# Patient Record
Sex: FEMALE | Race: WHITE | NOT HISPANIC OR LATINO | Employment: FULL TIME | URBAN - METROPOLITAN AREA
[De-identification: names, ages, dates, MRNs, and addresses within clinical notes are randomized per-mention and may not be internally consistent; named-entity substitution may affect disease eponyms.]

---

## 2017-12-18 ENCOUNTER — TRANSCRIBE ORDERS (OUTPATIENT)
Dept: ADMINISTRATIVE | Facility: HOSPITAL | Age: 67
End: 2017-12-18

## 2017-12-18 DIAGNOSIS — R53.1 WEAKNESS OF RIGHT SIDE OF BODY: ICD-10-CM

## 2017-12-18 DIAGNOSIS — I16.0 HYPERTENSIVE URGENCY: Primary | ICD-10-CM

## 2017-12-22 ENCOUNTER — HOSPITAL ENCOUNTER (OUTPATIENT)
Dept: CT IMAGING | Facility: HOSPITAL | Age: 67
Discharge: HOME | End: 2017-12-22
Admitting: NURSE PRACTITIONER

## 2017-12-22 DIAGNOSIS — R53.1 WEAKNESS OF RIGHT SIDE OF BODY: ICD-10-CM

## 2017-12-22 DIAGNOSIS — I16.0 HYPERTENSIVE URGENCY: ICD-10-CM

## 2017-12-22 PROCEDURE — 70450 CT HEAD/BRAIN W/O DYE: CPT

## 2018-01-03 ENCOUNTER — APPOINTMENT (OUTPATIENT)
Dept: CT IMAGING | Facility: HOSPITAL | Age: 68
End: 2018-01-03

## 2018-01-03 ENCOUNTER — APPOINTMENT (OUTPATIENT)
Dept: GENERAL RADIOLOGY | Facility: HOSPITAL | Age: 68
End: 2018-01-03

## 2018-01-03 ENCOUNTER — HOSPITAL ENCOUNTER (INPATIENT)
Facility: HOSPITAL | Age: 68
LOS: 5 days | Discharge: HOME | End: 2018-01-08
Attending: EMERGENCY MEDICINE | Admitting: INTERNAL MEDICINE

## 2018-01-03 DIAGNOSIS — I63.9 ACUTE CVA (CEREBROVASCULAR ACCIDENT) (HCC): Primary | ICD-10-CM

## 2018-01-03 DIAGNOSIS — I10 ESSENTIAL HYPERTENSION: ICD-10-CM

## 2018-01-03 LAB
ABO GROUP BLD: NORMAL
ALBUMIN SERPL-MCNC: 4.3 G/DL (ref 3.5–5.2)
ALBUMIN/GLOB SERPL: 1.2 G/DL
ALP SERPL-CCNC: 79 U/L (ref 39–117)
ALT SERPL W P-5'-P-CCNC: 27 U/L (ref 1–33)
ANION GAP SERPL CALCULATED.3IONS-SCNC: 12.4 MMOL/L
AST SERPL-CCNC: 14 U/L (ref 1–32)
BASOPHILS # BLD AUTO: 0.03 10*3/MM3 (ref 0–0.2)
BASOPHILS NFR BLD AUTO: 0.3 % (ref 0–1.5)
BILIRUB SERPL-MCNC: 0.6 MG/DL (ref 0.1–1.2)
BLD GP AB SCN SERPL QL: NEGATIVE
BUN BLD-MCNC: 8 MG/DL (ref 8–23)
BUN/CREAT SERPL: 11 (ref 7–25)
CALCIUM SPEC-SCNC: 9.4 MG/DL (ref 8.6–10.5)
CHLORIDE SERPL-SCNC: 103 MMOL/L (ref 98–107)
CO2 SERPL-SCNC: 28.6 MMOL/L (ref 22–29)
CREAT BLD-MCNC: 0.73 MG/DL (ref 0.57–1)
DEPRECATED RDW RBC AUTO: 44.1 FL (ref 37–54)
EOSINOPHIL # BLD AUTO: 0.34 10*3/MM3 (ref 0–0.7)
EOSINOPHIL NFR BLD AUTO: 2.9 % (ref 0.3–6.2)
ERYTHROCYTE [DISTWIDTH] IN BLOOD BY AUTOMATED COUNT: 13.4 % (ref 11.7–13)
GFR SERPL CREATININE-BSD FRML MDRD: 80 ML/MIN/1.73
GLOBULIN UR ELPH-MCNC: 3.6 GM/DL
GLUCOSE BLD-MCNC: 110 MG/DL (ref 65–99)
HCT VFR BLD AUTO: 44.6 % (ref 35.6–45.5)
HGB BLD-MCNC: 15.2 G/DL (ref 11.9–15.5)
HOLD SPECIMEN: NORMAL
HOLD SPECIMEN: NORMAL
IMM GRANULOCYTES # BLD: 0.04 10*3/MM3 (ref 0–0.03)
IMM GRANULOCYTES NFR BLD: 0.3 % (ref 0–0.5)
INR PPP: 0.95 (ref 0.9–1.1)
LYMPHOCYTES # BLD AUTO: 2.46 10*3/MM3 (ref 0.9–4.8)
LYMPHOCYTES NFR BLD AUTO: 20.9 % (ref 19.6–45.3)
MCH RBC QN AUTO: 31.3 PG (ref 26.9–32)
MCHC RBC AUTO-ENTMCNC: 34.1 G/DL (ref 32.4–36.3)
MCV RBC AUTO: 91.8 FL (ref 80.5–98.2)
MONOCYTES # BLD AUTO: 1.05 10*3/MM3 (ref 0.2–1.2)
MONOCYTES NFR BLD AUTO: 8.9 % (ref 5–12)
NEUTROPHILS # BLD AUTO: 7.84 10*3/MM3 (ref 1.9–8.1)
NEUTROPHILS NFR BLD AUTO: 66.7 % (ref 42.7–76)
PLATELET # BLD AUTO: 298 10*3/MM3 (ref 140–500)
PMV BLD AUTO: 10.1 FL (ref 6–12)
POTASSIUM BLD-SCNC: 3.1 MMOL/L (ref 3.5–5.2)
PROT SERPL-MCNC: 7.9 G/DL (ref 6–8.5)
PROTHROMBIN TIME: 12.3 SECONDS (ref 11.7–14.2)
RBC # BLD AUTO: 4.86 10*6/MM3 (ref 3.9–5.2)
RH BLD: POSITIVE
SODIUM BLD-SCNC: 144 MMOL/L (ref 136–145)
WBC NRBC COR # BLD: 11.76 10*3/MM3 (ref 4.5–10.7)
WHOLE BLOOD HOLD SPECIMEN: NORMAL
WHOLE BLOOD HOLD SPECIMEN: NORMAL

## 2018-01-03 PROCEDURE — 80053 COMPREHEN METABOLIC PANEL: CPT | Performed by: EMERGENCY MEDICINE

## 2018-01-03 PROCEDURE — 85610 PROTHROMBIN TIME: CPT | Performed by: EMERGENCY MEDICINE

## 2018-01-03 PROCEDURE — 85025 COMPLETE CBC W/AUTO DIFF WBC: CPT | Performed by: EMERGENCY MEDICINE

## 2018-01-03 PROCEDURE — 86901 BLOOD TYPING SEROLOGIC RH(D): CPT | Performed by: EMERGENCY MEDICINE

## 2018-01-03 PROCEDURE — 71045 X-RAY EXAM CHEST 1 VIEW: CPT

## 2018-01-03 PROCEDURE — 70450 CT HEAD/BRAIN W/O DYE: CPT

## 2018-01-03 PROCEDURE — 99285 EMERGENCY DEPT VISIT HI MDM: CPT

## 2018-01-03 PROCEDURE — 93005 ELECTROCARDIOGRAM TRACING: CPT | Performed by: EMERGENCY MEDICINE

## 2018-01-03 PROCEDURE — 86900 BLOOD TYPING SEROLOGIC ABO: CPT | Performed by: EMERGENCY MEDICINE

## 2018-01-03 PROCEDURE — 86850 RBC ANTIBODY SCREEN: CPT | Performed by: EMERGENCY MEDICINE

## 2018-01-03 PROCEDURE — 93010 ELECTROCARDIOGRAM REPORT: CPT | Performed by: INTERNAL MEDICINE

## 2018-01-03 RX ORDER — LABETALOL HYDROCHLORIDE 5 MG/ML
10 INJECTION, SOLUTION INTRAVENOUS ONCE
Status: COMPLETED | OUTPATIENT
Start: 2018-01-03 | End: 2018-01-03

## 2018-01-03 RX ORDER — LOSARTAN POTASSIUM AND HYDROCHLOROTHIAZIDE 25; 100 MG/1; MG/1
1 TABLET ORAL DAILY
COMMUNITY
End: 2018-08-23 | Stop reason: ALTCHOICE

## 2018-01-03 RX ORDER — DILTIAZEM HYDROCHLORIDE 240 MG/1
240 CAPSULE, COATED, EXTENDED RELEASE ORAL DAILY
COMMUNITY
End: 2018-08-23 | Stop reason: ALTCHOICE

## 2018-01-03 RX ORDER — ASPIRIN 325 MG
325 TABLET ORAL ONCE
Status: COMPLETED | OUTPATIENT
Start: 2018-01-03 | End: 2018-01-03

## 2018-01-03 RX ORDER — CLONIDINE HYDROCHLORIDE 0.1 MG/1
0.1 TABLET ORAL DAILY
COMMUNITY

## 2018-01-03 RX ORDER — SODIUM CHLORIDE 0.9 % (FLUSH) 0.9 %
10 SYRINGE (ML) INJECTION AS NEEDED
Status: DISCONTINUED | OUTPATIENT
Start: 2018-01-03 | End: 2018-01-08 | Stop reason: HOSPADM

## 2018-01-03 RX ADMIN — LABETALOL HYDROCHLORIDE 10 MG: 5 INJECTION, SOLUTION INTRAVENOUS at 19:24

## 2018-01-03 RX ADMIN — ASPIRIN 325 MG: 325 TABLET ORAL at 19:23

## 2018-01-03 NOTE — ED PROVIDER NOTES
EMERGENCY DEPARTMENT ENCOUNTER    CHIEF COMPLAINT  Chief Complaint: Confusion and right sided weakness  History given by: Patient  History limited by: none  Room Number: 04/04  PMD: JENNIFER Cintron      HPI:  Pt is a 67 y.o. female who presents complaining of confusion and constant right sided weakness that began suddenly at 1300 upon waking up. Pt states she had no symptoms when getting off work at 0100 this morning. Pt confirms difficulty speaking, but denies numbness and headache. Pt states she had a similar episode 12/18 and saw PCP where she was put on medication for HTN. Pt states weakness never fully went away after episode 3 weeks ago. Pt states she has no kx of TIA or CVA.     Duration:  5 hours  Onset: sudden  Timing: constant  Location: right side  Radiation: none  Quality: confusion and weakness  Intensity/Severity: moderate  Progression: unchanged  Associated Symptoms: difficulty speaking  Aggravating Factors: none  Alleviating Factors: none  Previous Episodes: Pt had a similar episode on 12/18 and was seen by PCP.  Treatment before arrival: none    PAST MEDICAL HISTORY  Active Ambulatory Problems     Diagnosis Date Noted   • No Active Ambulatory Problems     Resolved Ambulatory Problems     Diagnosis Date Noted   • No Resolved Ambulatory Problems     Past Medical History:   Diagnosis Date   • Hypertension    • TIA (transient ischemic attack)        PAST SURGICAL HISTORY  Past Surgical History:   Procedure Laterality Date   • TONSILLECTOMY     • TUBAL ABDOMINAL LIGATION     • WISDOM TOOTH EXTRACTION         FAMILY HISTORY  History reviewed. No pertinent family history.    SOCIAL HISTORY  Social History     Social History   • Marital status: Single     Spouse name: N/A   • Number of children: N/A   • Years of education: N/A     Occupational History   • Not on file.     Social History Main Topics   • Smoking status: Never Smoker   • Smokeless tobacco: Not on file   • Alcohol use No   • Drug use: No   •  Sexual activity: Not on file     Other Topics Concern   • Not on file     Social History Narrative   • No narrative on file       ALLERGIES  Review of patient's allergies indicates no known allergies.    REVIEW OF SYSTEMS  Review of Systems   Constitutional: Negative for fever.   HENT: Negative for sore throat.    Eyes: Negative.    Respiratory: Negative for cough and shortness of breath.    Cardiovascular: Negative for chest pain.   Gastrointestinal: Negative for abdominal pain, diarrhea and vomiting.   Genitourinary: Negative for dysuria.   Musculoskeletal: Negative for neck pain.   Skin: Negative for rash.   Allergic/Immunologic: Negative.    Neurological: Positive for speech difficulty and weakness (right sided). Negative for numbness and headaches.   Hematological: Negative.    Psychiatric/Behavioral: Positive for confusion.   All other systems reviewed and are negative.      PHYSICAL EXAM  ED Triage Vitals   Temp Heart Rate Resp BP SpO2   01/03/18 1800 01/03/18 1800 01/03/18 1800 01/03/18 1802 01/03/18 1800   98.2 °F (36.8 °C) 90 18 205/103 98 %      Temp src Heart Rate Source Patient Position BP Location FiO2 (%)   01/03/18 1800 -- -- -- --   Tympanic           Physical Exam   Constitutional: She is oriented to person, place, and time and well-developed, well-nourished, and in no distress. No distress.   HENT:   Head: Normocephalic and atraumatic.   Eyes: EOM are normal. Pupils are equal, round, and reactive to light.   Neck: Normal range of motion. Neck supple.   Cardiovascular: Normal rate, regular rhythm and normal heart sounds.    Pulmonary/Chest: Effort normal and breath sounds normal. No respiratory distress.   Abdominal: Soft. There is no tenderness. There is no rebound and no guarding.   Musculoskeletal: Normal range of motion. She exhibits no edema.   Neurological: She is alert and oriented to person, place, and time. She has normal sensation, normal strength and intact cranial nerves. She has a  normal Finger-Nose-Finger Test and a normal Heel to Shin Test.   Stroke Scale: 1 for right leg drift.    Skin: Skin is warm and dry. No rash noted.   Psychiatric: Mood and affect normal.   Nursing note and vitals reviewed.      LAB RESULTS  Lab Results (last 24 hours)     Procedure Component Value Units Date/Time    CBC & Differential [[id number]] Collected:  01/03/18 1845    Specimen:  Blood Updated:  01/03/18 1951    Narrative:       The following orders were created for panel order CBC & Differential.  Procedure                               Abnormality         Status                     ---------                               -----------         ------                     CBC Auto Differential[[id number]]        Abnormal            Final result                 Please view results for these tests on the individual orders.    Comprehensive Metabolic Panel [[id number]] Collected:  01/03/18 1845    Specimen:  Blood Updated:  01/03/18 1946    Protime-INR [[id number]]  (Normal) Collected:  01/03/18 1845    Specimen:  Blood Updated:  01/03/18 2000     Protime 12.3 Seconds      INR 0.95    CBC Auto Differential [[id number]]  (Abnormal) Collected:  01/03/18 1845    Specimen:  Blood Updated:  01/03/18 1951     WBC 11.76 (H) 10*3/mm3      RBC 4.86 10*6/mm3      Hemoglobin 15.2 g/dL      Hematocrit 44.6 %      MCV 91.8 fL      MCH 31.3 pg      MCHC 34.1 g/dL      RDW 13.4 (H) %      RDW-SD 44.1 fl      MPV 10.1 fL      Platelets 298 10*3/mm3      Neutrophil % 66.7 %      Lymphocyte % 20.9 %      Monocyte % 8.9 %      Eosinophil % 2.9 %      Basophil % 0.3 %      Immature Grans % 0.3 %      Neutrophils, Absolute 7.84 10*3/mm3      Lymphocytes, Absolute 2.46 10*3/mm3      Monocytes, Absolute 1.05 10*3/mm3      Eosinophils, Absolute 0.34 10*3/mm3      Basophils, Absolute 0.03 10*3/mm3      Immature Grans, Absolute 0.04 (H) 10*3/mm3           I ordered the above labs and reviewed the results    RADIOLOGY  XR Chest 1 View   Final  Result   No focal pulmonary consolidation. Enlarged cardiac   silhouette. Tortuous aorta. Follow-up/further evaluation can be obtained   as indicated.       This report was finalized on 1/3/2018 6:47 PM by Dr. Raciel Devries MD.          CT Head Without Contrast Stroke Protocol    (Results Pending)  Several old left infarcts, but one subacute infarct in left frontal lobe.         I ordered the above noted radiological studies. Interpreted by radiologist. Discussed with radiologist (Greyson). Reviewed by me in PACS.       PROCEDURES  Procedures  EKG           EKG time: 1901  Rhythm/Rate: SR 63  P waves and [place]: nml  QRS, axis: left axis deviation, LVH   ST and T waves: nonspecific T wave changes laterally     Interpreted Contemporaneously by me, independently viewed  No prior for comparison.     PROGRESS AND CONSULTS  ED Course     1807  Labs, CXR, and CT Head ordered for further evaluation.     1903   mg and Normodyne ordered for symptom management.     2023  Call placed to [facility].     2044  Discussed pt's case with Dr. Graham ([facility]) who agreed to admit pt to telemetry due to CVA and symptoms.       MEDICAL DECISION MAKING  Results were reviewed/discussed with the patient and they were also made aware of online access. Pt also made aware that some labs, such as cultures, will not be resulted during ER visit and follow up with PMD is necessary.     MDM  Number of Diagnoses or Management Options  Acute CVA (cerebrovascular accident):   Essential hypertension:   Diagnosis management comments: Patient states she's had right-sided weakness for about 2 weeks.  She had a head CT done at that time which showed multiple small infarcts.  She woke up today around 1 PM and noticed increased weakness in her right arm and leg.  She was not a TPA candidate because her symptoms have been present for over 12 hours.  Her initial NIH score was 1.  Head CT showed a new left sided infarct.  Patient was given an aspirin.  She was  hypertensive and was given a dose of IV labetalol.  She states her blood pressure has been elevated recently.  She was recently started on several new blood pressure meds.  Case was discussed with Dr. Graham and he agreed to admit the patient.       Amount and/or Complexity of Data Reviewed  Clinical lab tests: ordered and reviewed (WBC - 11.76)  Tests in the radiology section of CPT®: ordered and reviewed (CXR- no focal pulmonary consolidation, enlarged cardiac silhouette, tortuous aorta.     CT Head - several old left infarcts, subacute infarct in left frontal lobe. )  Tests in the medicine section of CPT®: ordered and reviewed (See note. )  Review and summarize past medical records: yes (12/22/17 - CT Head - showed atrophy, multiple small infarct, no acute infarct.     Pt Is not a TPA candidate. Symptoms have been present for greater than 12 hours. )  Discuss the patient with other providers: yes (Dr. Graham ([facility]))          NIHSS (NIH Stroke Scale/Score) reviewed and/or performed as part of the patient evaluation and treatment planning process.  The result associated with this review/performance is: 1      DIAGNOSIS  Final diagnoses:   None       DISPOSITION  ADMISSION    Discussed treatment plan and reason for admission with pt/family and admitting physician.  Pt/family voiced understanding of the plan for admission for further testing/treatment as needed.       Latest Documented Vital Signs:  As of 8:03 PM  BP- 169/86 HR- 55 Temp- 98.2 °F (36.8 °C) (Tympanic) O2 sat- 97%    --  Documentation assistance provided by alie Pacheco for Dr. Armas.  Information recorded by the alie was done at my direction and has been verified and validated by me.                 Jenifer Pacheco  01/03/18 204       Marco Antonio Armas MD  01/03/18 2536

## 2018-01-04 ENCOUNTER — APPOINTMENT (OUTPATIENT)
Dept: MRI IMAGING | Facility: HOSPITAL | Age: 68
End: 2018-01-04
Attending: INTERNAL MEDICINE

## 2018-01-04 ENCOUNTER — APPOINTMENT (OUTPATIENT)
Dept: CARDIOLOGY | Facility: HOSPITAL | Age: 68
End: 2018-01-04
Attending: INTERNAL MEDICINE

## 2018-01-04 PROBLEM — E78.5 DYSLIPIDEMIA: Status: ACTIVE | Noted: 2018-01-04

## 2018-01-04 PROBLEM — I67.2 INTRACRANIAL ATHEROSCLEROSIS: Chronic | Status: ACTIVE | Noted: 2018-01-04

## 2018-01-04 PROBLEM — I10 HTN (HYPERTENSION): Status: ACTIVE | Noted: 2018-01-04

## 2018-01-04 LAB
ALBUMIN SERPL-MCNC: 4 G/DL (ref 3.5–5.2)
ALBUMIN/GLOB SERPL: 1.3 G/DL
ALP SERPL-CCNC: 74 U/L (ref 39–117)
ALT SERPL W P-5'-P-CCNC: 24 U/L (ref 1–33)
ANION GAP SERPL CALCULATED.3IONS-SCNC: 9.8 MMOL/L
ASCENDING AORTA: 3 CM
AST SERPL-CCNC: 10 U/L (ref 1–32)
BH CV ECHO MEAS - ACS: 1.4 CM
BH CV ECHO MEAS - AO MAX PG: 23 MMHG
BH CV ECHO MEAS - AO MEAN PG (FULL): 8 MMHG
BH CV ECHO MEAS - AO MEAN PG: 12 MMHG
BH CV ECHO MEAS - AO ROOT AREA (BSA CORRECTED): 1.5
BH CV ECHO MEAS - AO ROOT AREA: 5.7 CM^2
BH CV ECHO MEAS - AO ROOT DIAM: 2.7 CM
BH CV ECHO MEAS - AO V2 MAX: 240 CM/SEC
BH CV ECHO MEAS - AO V2 MEAN: 162 CM/SEC
BH CV ECHO MEAS - AO V2 VTI: 46 CM
BH CV ECHO MEAS - ASC AORTA: 3 CM
BH CV ECHO MEAS - AVA(I,A): 2.4 CM^2
BH CV ECHO MEAS - AVA(I,D): 2.4 CM^2
BH CV ECHO MEAS - BSA(HAYCOCK): 1.8 M^2
BH CV ECHO MEAS - BSA: 1.7 M^2
BH CV ECHO MEAS - BZI_BMI: 33.2 KILOGRAMS/M^2
BH CV ECHO MEAS - BZI_METRIC_HEIGHT: 152.4 CM
BH CV ECHO MEAS - BZI_METRIC_WEIGHT: 77.1 KG
BH CV ECHO MEAS - CONTRAST EF (2CH): 67.2 ML/M^2
BH CV ECHO MEAS - CONTRAST EF 4CH: 60.2 ML/M^2
BH CV ECHO MEAS - EDV(CUBED): 148.9 ML
BH CV ECHO MEAS - EDV(MOD-SP2): 67 ML
BH CV ECHO MEAS - EDV(MOD-SP4): 103 ML
BH CV ECHO MEAS - EDV(TEICH): 135.3 ML
BH CV ECHO MEAS - EF(CUBED): 71.2 %
BH CV ECHO MEAS - EF(MOD-SP2): 67.2 %
BH CV ECHO MEAS - EF(MOD-SP4): 60.2 %
BH CV ECHO MEAS - EF(TEICH): 62.4 %
BH CV ECHO MEAS - ESV(CUBED): 42.9 ML
BH CV ECHO MEAS - ESV(MOD-SP2): 22 ML
BH CV ECHO MEAS - ESV(MOD-SP4): 41 ML
BH CV ECHO MEAS - ESV(TEICH): 50.9 ML
BH CV ECHO MEAS - FS: 34 %
BH CV ECHO MEAS - IVS/LVPW: 1
BH CV ECHO MEAS - IVSD: 1.3 CM
BH CV ECHO MEAS - LAT PEAK E' VEL: 7 CM/SEC
BH CV ECHO MEAS - LV DIASTOLIC VOL/BSA (35-75): 59.1 ML/M^2
BH CV ECHO MEAS - LV MASS(C)D: 286.9 GRAMS
BH CV ECHO MEAS - LV MASS(C)DI: 164.7 GRAMS/M^2
BH CV ECHO MEAS - LV MEAN PG: 4 MMHG
BH CV ECHO MEAS - LV SYSTOLIC VOL/BSA (12-30): 23.5 ML/M^2
BH CV ECHO MEAS - LV V1 MEAN: 88.8 CM/SEC
BH CV ECHO MEAS - LV V1 VTI: 31.6 CM
BH CV ECHO MEAS - LVIDD: 5.3 CM
BH CV ECHO MEAS - LVIDS: 3.5 CM
BH CV ECHO MEAS - LVLD AP2: 8 CM
BH CV ECHO MEAS - LVLD AP4: 8.2 CM
BH CV ECHO MEAS - LVLS AP2: 6.5 CM
BH CV ECHO MEAS - LVLS AP4: 6.8 CM
BH CV ECHO MEAS - LVOT AREA (M): 3.5 CM^2
BH CV ECHO MEAS - LVOT AREA: 3.5 CM^2
BH CV ECHO MEAS - LVOT DIAM: 2.1 CM
BH CV ECHO MEAS - LVPWD: 1.3 CM
BH CV ECHO MEAS - MED PEAK E' VEL: 4 CM/SEC
BH CV ECHO MEAS - MR MAX PG: 53.6 MMHG
BH CV ECHO MEAS - MR MAX VEL: 366 CM/SEC
BH CV ECHO MEAS - MV A DUR: 127 SEC
BH CV ECHO MEAS - MV A MAX VEL: 96.9 CM/SEC
BH CV ECHO MEAS - MV DEC SLOPE: 308 CM/SEC^2
BH CV ECHO MEAS - MV DEC TIME: 288 SEC
BH CV ECHO MEAS - MV E MAX VEL: 95 CM/SEC
BH CV ECHO MEAS - MV E/A: 0.98
BH CV ECHO MEAS - MV MEAN PG: 2 MMHG
BH CV ECHO MEAS - MV P1/2T MAX VEL: 105 CM/SEC
BH CV ECHO MEAS - MV P1/2T: 99.9 MSEC
BH CV ECHO MEAS - MV V2 MEAN: 56.7 CM/SEC
BH CV ECHO MEAS - MV V2 VTI: 38.2 CM
BH CV ECHO MEAS - MVA P1/2T LCG: 2.1 CM^2
BH CV ECHO MEAS - MVA(P1/2T): 2.2 CM^2
BH CV ECHO MEAS - MVA(VTI): 2.9 CM^2
BH CV ECHO MEAS - PA ACC SLOPE: 930 CM/SEC^2
BH CV ECHO MEAS - PA ACC TIME: 0.12 SEC
BH CV ECHO MEAS - PA MAX PG (FULL): 0.86 MMHG
BH CV ECHO MEAS - PA MAX PG: 5.1 MMHG
BH CV ECHO MEAS - PA PR(ACCEL): 25 MMHG
BH CV ECHO MEAS - PA V2 MAX: 113 CM/SEC
BH CV ECHO MEAS - PULM A REVS DUR: 88 SEC
BH CV ECHO MEAS - PULM A REVS VEL: 28.1 CM/SEC
BH CV ECHO MEAS - PULM DIAS VEL: 30.3 CM/SEC
BH CV ECHO MEAS - PULM S/D: 1.2
BH CV ECHO MEAS - PULM SYS VEL: 37.2 CM/SEC
BH CV ECHO MEAS - PVA(V,A): 2.6 CM^2
BH CV ECHO MEAS - PVA(V,D): 2.6 CM^2
BH CV ECHO MEAS - QP/QS: 0.56
BH CV ECHO MEAS - RAP SYSTOLE: 3 MMHG
BH CV ECHO MEAS - RV MAX PG: 4.2 MMHG
BH CV ECHO MEAS - RV MEAN PG: 2 MMHG
BH CV ECHO MEAS - RV V1 MAX: 103 CM/SEC
BH CV ECHO MEAS - RV V1 MEAN: 64.8 CM/SEC
BH CV ECHO MEAS - RV V1 VTI: 21.6 CM
BH CV ECHO MEAS - RVOT AREA: 2.8 CM^2
BH CV ECHO MEAS - RVOT DIAM: 1.9 CM
BH CV ECHO MEAS - RVSP: 19 MMHG
BH CV ECHO MEAS - SI(AO): 151.2 ML/M^2
BH CV ECHO MEAS - SI(CUBED): 60.8 ML/M^2
BH CV ECHO MEAS - SI(LVOT): 62.8 ML/M^2
BH CV ECHO MEAS - SI(MOD-SP2): 25.8 ML/M^2
BH CV ECHO MEAS - SI(MOD-SP4): 35.6 ML/M^2
BH CV ECHO MEAS - SI(TEICH): 48.5 ML/M^2
BH CV ECHO MEAS - SV(AO): 263.4 ML
BH CV ECHO MEAS - SV(CUBED): 106 ML
BH CV ECHO MEAS - SV(LVOT): 109.5 ML
BH CV ECHO MEAS - SV(MOD-SP2): 45 ML
BH CV ECHO MEAS - SV(MOD-SP4): 62 ML
BH CV ECHO MEAS - SV(RVOT): 61.2 ML
BH CV ECHO MEAS - SV(TEICH): 84.5 ML
BH CV ECHO MEAS - TAPSE (>1.6): 1.7 CM2
BH CV ECHO MEAS - TR MAX VEL: 197 CM/SEC
BH CV VAS BP RIGHT ARM: NORMAL MMHG
BH CV XLRA - RV BASE: 4.1 CM
BH CV XLRA - RV LENGTH: 9.3 CM
BH CV XLRA - RV MID: 3.3 CM
BH CV XLRA - TDI S': 13 CM/SEC
BILIRUB SERPL-MCNC: 0.6 MG/DL (ref 0.1–1.2)
BUN BLD-MCNC: 8 MG/DL (ref 8–23)
BUN/CREAT SERPL: 12.3 (ref 7–25)
CALCIUM SPEC-SCNC: 9 MG/DL (ref 8.6–10.5)
CHLORIDE SERPL-SCNC: 101 MMOL/L (ref 98–107)
CHOLEST SERPL-MCNC: 213 MG/DL (ref 0–200)
CO2 SERPL-SCNC: 30.2 MMOL/L (ref 22–29)
CREAT BLD-MCNC: 0.65 MG/DL (ref 0.57–1)
CRP SERPL-MCNC: 0.73 MG/DL (ref 0–0.5)
DEPRECATED RDW RBC AUTO: 45.5 FL (ref 37–54)
E/E' RATIO: 18
ERYTHROCYTE [DISTWIDTH] IN BLOOD BY AUTOMATED COUNT: 13.3 % (ref 11.7–13)
ERYTHROCYTE [SEDIMENTATION RATE] IN BLOOD: 19 MM/HR (ref 0–30)
FIBRINOGEN PPP-MCNC: 390 MG/DL (ref 219–464)
GFR SERPL CREATININE-BSD FRML MDRD: 91 ML/MIN/1.73
GLOBULIN UR ELPH-MCNC: 3.1 GM/DL
GLUCOSE BLD-MCNC: 97 MG/DL (ref 65–99)
GLUCOSE BLDC GLUCOMTR-MCNC: 95 MG/DL (ref 70–130)
HBA1C MFR BLD: 5.6 % (ref 4.8–5.6)
HCT VFR BLD AUTO: 45.8 % (ref 35.6–45.5)
HDLC SERPL-MCNC: 34 MG/DL (ref 40–60)
HGB BLD-MCNC: 14.7 G/DL (ref 11.9–15.5)
INR PPP: 1.08 (ref 0.9–1.1)
LDLC SERPL CALC-MCNC: 139 MG/DL (ref 0–100)
LDLC/HDLC SERPL: 4.1 {RATIO}
LEFT ATRIUM VOLUME INDEX: 33 ML/M2
MAGNESIUM SERPL-MCNC: 2.5 MG/DL (ref 1.6–2.4)
MCH RBC QN AUTO: 30.1 PG (ref 26.9–32)
MCHC RBC AUTO-ENTMCNC: 32.1 G/DL (ref 32.4–36.3)
MCV RBC AUTO: 93.7 FL (ref 80.5–98.2)
PLATELET # BLD AUTO: 281 10*3/MM3 (ref 140–500)
PMV BLD AUTO: 9.9 FL (ref 6–12)
POTASSIUM BLD-SCNC: 3.5 MMOL/L (ref 3.5–5.2)
PROT SERPL-MCNC: 7.1 G/DL (ref 6–8.5)
PROTHROMBIN TIME: 13.6 SECONDS (ref 11.7–14.2)
RBC # BLD AUTO: 4.89 10*6/MM3 (ref 3.9–5.2)
SODIUM BLD-SCNC: 141 MMOL/L (ref 136–145)
T4 FREE SERPL-MCNC: 1.14 NG/DL (ref 0.93–1.7)
TRIGL SERPL-MCNC: 198 MG/DL (ref 0–150)
TSH SERPL DL<=0.05 MIU/L-ACNC: 2.27 MIU/ML (ref 0.27–4.2)
VLDLC SERPL-MCNC: 39.6 MG/DL (ref 5–40)
WBC NRBC COR # BLD: 9.78 10*3/MM3 (ref 4.5–10.7)

## 2018-01-04 PROCEDURE — 97161 PT EVAL LOW COMPLEX 20 MIN: CPT

## 2018-01-04 PROCEDURE — 82962 GLUCOSE BLOOD TEST: CPT

## 2018-01-04 PROCEDURE — 85384 FIBRINOGEN ACTIVITY: CPT | Performed by: RADIOLOGY

## 2018-01-04 PROCEDURE — 85652 RBC SED RATE AUTOMATED: CPT | Performed by: RADIOLOGY

## 2018-01-04 PROCEDURE — 83090 ASSAY OF HOMOCYSTEINE: CPT | Performed by: RADIOLOGY

## 2018-01-04 PROCEDURE — 86140 C-REACTIVE PROTEIN: CPT | Performed by: RADIOLOGY

## 2018-01-04 PROCEDURE — 93306 TTE W/DOPPLER COMPLETE: CPT

## 2018-01-04 PROCEDURE — 85027 COMPLETE CBC AUTOMATED: CPT | Performed by: INTERNAL MEDICINE

## 2018-01-04 PROCEDURE — 70553 MRI BRAIN STEM W/O & W/DYE: CPT

## 2018-01-04 PROCEDURE — 0 GADOBENATE DIMEGLUMINE 529 MG/ML SOLUTION: Performed by: HOSPITALIST

## 2018-01-04 PROCEDURE — 70544 MR ANGIOGRAPHY HEAD W/O DYE: CPT

## 2018-01-04 PROCEDURE — 80053 COMPREHEN METABOLIC PANEL: CPT | Performed by: INTERNAL MEDICINE

## 2018-01-04 PROCEDURE — A9577 INJ MULTIHANCE: HCPCS | Performed by: HOSPITALIST

## 2018-01-04 PROCEDURE — 25010000002 PERFLUTREN (DEFINITY) 8.476 MG IN SODIUM CHLORIDE 0.9 % 10 ML INJECTION: Performed by: HOSPITALIST

## 2018-01-04 PROCEDURE — 84439 ASSAY OF FREE THYROXINE: CPT | Performed by: INTERNAL MEDICINE

## 2018-01-04 PROCEDURE — 70549 MR ANGIOGRAPH NECK W/O&W/DYE: CPT

## 2018-01-04 PROCEDURE — 92610 EVALUATE SWALLOWING FUNCTION: CPT

## 2018-01-04 PROCEDURE — 84443 ASSAY THYROID STIM HORMONE: CPT | Performed by: INTERNAL MEDICINE

## 2018-01-04 PROCEDURE — 93306 TTE W/DOPPLER COMPLETE: CPT | Performed by: INTERNAL MEDICINE

## 2018-01-04 PROCEDURE — 99255 IP/OBS CONSLTJ NEW/EST HI 80: CPT | Performed by: RADIOLOGY

## 2018-01-04 PROCEDURE — 80061 LIPID PANEL: CPT | Performed by: INTERNAL MEDICINE

## 2018-01-04 PROCEDURE — 85610 PROTHROMBIN TIME: CPT | Performed by: INTERNAL MEDICINE

## 2018-01-04 PROCEDURE — 99253 IP/OBS CNSLTJ NEW/EST LOW 45: CPT | Performed by: INTERNAL MEDICINE

## 2018-01-04 PROCEDURE — 97110 THERAPEUTIC EXERCISES: CPT

## 2018-01-04 PROCEDURE — 83735 ASSAY OF MAGNESIUM: CPT | Performed by: INTERNAL MEDICINE

## 2018-01-04 PROCEDURE — 83036 HEMOGLOBIN GLYCOSYLATED A1C: CPT | Performed by: INTERNAL MEDICINE

## 2018-01-04 RX ORDER — POTASSIUM CHLORIDE 7.45 MG/ML
10 INJECTION INTRAVENOUS
Status: DISCONTINUED | OUTPATIENT
Start: 2018-01-04 | End: 2018-01-08 | Stop reason: HOSPADM

## 2018-01-04 RX ORDER — HYDROCHLOROTHIAZIDE 25 MG/1
25 TABLET ORAL DAILY
Status: DISCONTINUED | OUTPATIENT
Start: 2018-01-04 | End: 2018-01-08 | Stop reason: HOSPADM

## 2018-01-04 RX ORDER — LORAZEPAM 1 MG/1
1 TABLET ORAL ONCE AS NEEDED
Status: COMPLETED | OUTPATIENT
Start: 2018-01-04 | End: 2018-01-04

## 2018-01-04 RX ORDER — POTASSIUM CHLORIDE 750 MG/1
40 CAPSULE, EXTENDED RELEASE ORAL AS NEEDED
Status: DISCONTINUED | OUTPATIENT
Start: 2018-01-04 | End: 2018-01-08 | Stop reason: HOSPADM

## 2018-01-04 RX ORDER — POTASSIUM CHLORIDE 1.5 G/1.77G
40 POWDER, FOR SOLUTION ORAL AS NEEDED
Status: DISCONTINUED | OUTPATIENT
Start: 2018-01-04 | End: 2018-01-08 | Stop reason: HOSPADM

## 2018-01-04 RX ORDER — ASPIRIN 300 MG/1
300 SUPPOSITORY RECTAL DAILY
Status: DISCONTINUED | OUTPATIENT
Start: 2018-01-04 | End: 2018-01-08 | Stop reason: HOSPADM

## 2018-01-04 RX ORDER — SODIUM CHLORIDE 9 MG/ML
75 INJECTION, SOLUTION INTRAVENOUS CONTINUOUS
Status: DISCONTINUED | OUTPATIENT
Start: 2018-01-04 | End: 2018-01-05

## 2018-01-04 RX ORDER — ALUMINA, MAGNESIA, AND SIMETHICONE 2400; 2400; 240 MG/30ML; MG/30ML; MG/30ML
7.5 SUSPENSION ORAL EVERY 4 HOURS PRN
Status: DISCONTINUED | OUTPATIENT
Start: 2018-01-04 | End: 2018-01-08 | Stop reason: HOSPADM

## 2018-01-04 RX ORDER — ONDANSETRON 2 MG/ML
4 INJECTION INTRAMUSCULAR; INTRAVENOUS EVERY 6 HOURS PRN
Status: DISCONTINUED | OUTPATIENT
Start: 2018-01-04 | End: 2018-01-08 | Stop reason: HOSPADM

## 2018-01-04 RX ORDER — CLOPIDOGREL BISULFATE 75 MG/1
75 TABLET ORAL DAILY
Status: DISCONTINUED | OUTPATIENT
Start: 2018-01-05 | End: 2018-01-08 | Stop reason: HOSPADM

## 2018-01-04 RX ORDER — LOSARTAN POTASSIUM 100 MG/1
100 TABLET ORAL
Status: DISCONTINUED | OUTPATIENT
Start: 2018-01-04 | End: 2018-01-08 | Stop reason: HOSPADM

## 2018-01-04 RX ORDER — ASPIRIN 325 MG
325 TABLET ORAL DAILY
Status: DISCONTINUED | OUTPATIENT
Start: 2018-01-04 | End: 2018-01-08 | Stop reason: HOSPADM

## 2018-01-04 RX ORDER — PRAVASTATIN SODIUM 20 MG
20 TABLET ORAL EVERY EVENING
Status: DISCONTINUED | OUTPATIENT
Start: 2018-01-04 | End: 2018-01-05

## 2018-01-04 RX ORDER — LABETALOL HYDROCHLORIDE 5 MG/ML
20 INJECTION, SOLUTION INTRAVENOUS EVERY 6 HOURS PRN
Status: DISCONTINUED | OUTPATIENT
Start: 2018-01-04 | End: 2018-01-08 | Stop reason: HOSPADM

## 2018-01-04 RX ORDER — CLONIDINE HYDROCHLORIDE 0.1 MG/1
0.2 TABLET ORAL EVERY 12 HOURS SCHEDULED
Status: DISCONTINUED | OUTPATIENT
Start: 2018-01-04 | End: 2018-01-05

## 2018-01-04 RX ORDER — ATORVASTATIN CALCIUM 80 MG/1
80 TABLET, FILM COATED ORAL NIGHTLY
Status: DISCONTINUED | OUTPATIENT
Start: 2018-01-04 | End: 2018-01-04

## 2018-01-04 RX ORDER — LOSARTAN POTASSIUM AND HYDROCHLOROTHIAZIDE 25; 100 MG/1; MG/1
1 TABLET ORAL DAILY
Status: DISCONTINUED | OUTPATIENT
Start: 2018-01-04 | End: 2018-01-04 | Stop reason: CLARIF

## 2018-01-04 RX ORDER — BISACODYL 10 MG
10 SUPPOSITORY, RECTAL RECTAL DAILY PRN
Status: DISCONTINUED | OUTPATIENT
Start: 2018-01-04 | End: 2018-01-08 | Stop reason: HOSPADM

## 2018-01-04 RX ORDER — SODIUM CHLORIDE 0.9 % (FLUSH) 0.9 %
1-10 SYRINGE (ML) INJECTION AS NEEDED
Status: DISCONTINUED | OUTPATIENT
Start: 2018-01-04 | End: 2018-01-08 | Stop reason: HOSPADM

## 2018-01-04 RX ORDER — DOCUSATE SODIUM 100 MG/1
100 CAPSULE, LIQUID FILLED ORAL 2 TIMES DAILY PRN
Status: DISCONTINUED | OUTPATIENT
Start: 2018-01-04 | End: 2018-01-08 | Stop reason: HOSPADM

## 2018-01-04 RX ORDER — CLOPIDOGREL BISULFATE 75 MG/1
300 TABLET ORAL ONCE
Status: COMPLETED | OUTPATIENT
Start: 2018-01-04 | End: 2018-01-04

## 2018-01-04 RX ORDER — ROSUVASTATIN CALCIUM 10 MG/1
10 TABLET, COATED ORAL NIGHTLY
Status: DISCONTINUED | OUTPATIENT
Start: 2018-01-04 | End: 2018-01-04

## 2018-01-04 RX ADMIN — ATORVASTATIN CALCIUM 80 MG: 80 TABLET, FILM COATED ORAL at 02:14

## 2018-01-04 RX ADMIN — SODIUM CHLORIDE 125 ML/HR: 9 INJECTION, SOLUTION INTRAVENOUS at 22:31

## 2018-01-04 RX ADMIN — HYDROCHLOROTHIAZIDE 25 MG: 25 TABLET ORAL at 16:45

## 2018-01-04 RX ADMIN — LORAZEPAM 1 MG: 1 TABLET ORAL at 08:18

## 2018-01-04 RX ADMIN — PERFLUTREN 5 ML: 6.52 INJECTION, SUSPENSION INTRAVENOUS at 13:29

## 2018-01-04 RX ADMIN — CLONIDINE HYDROCHLORIDE 0.2 MG: 0.1 TABLET ORAL at 20:17

## 2018-01-04 RX ADMIN — ASPIRIN 325 MG: 325 TABLET ORAL at 08:18

## 2018-01-04 RX ADMIN — GADOBENATE DIMEGLUMINE 16 ML: 529 INJECTION, SOLUTION INTRAVENOUS at 10:00

## 2018-01-04 RX ADMIN — CLONIDINE HYDROCHLORIDE 0.2 MG: 0.1 TABLET ORAL at 08:18

## 2018-01-04 RX ADMIN — SODIUM CHLORIDE 125 ML/HR: 9 INJECTION, SOLUTION INTRAVENOUS at 11:50

## 2018-01-04 RX ADMIN — ROSUVASTATIN CALCIUM 10 MG: 10 TABLET, FILM COATED ORAL at 20:18

## 2018-01-04 RX ADMIN — LOSARTAN POTASSIUM 100 MG: 100 TABLET ORAL at 16:46

## 2018-01-04 RX ADMIN — CLONIDINE HYDROCHLORIDE 0.2 MG: 0.1 TABLET ORAL at 02:14

## 2018-01-04 RX ADMIN — CLOPIDOGREL 300 MG: 75 TABLET, FILM COATED ORAL at 16:46

## 2018-01-04 NOTE — CONSULTS
"DOS: 2018  NAME: Roberta Houston   : 1950  PCP: JENNIFER Cintron  CC: Stroke  Referring MD: Mark Graham MD    Neurological Problem and Interval History:  67 y.o. RHW female with a Hx of hypertension.  Was in her usual state of good health with chronically uncontrolled hypertension went about 3 weeks ago she woke up with right-sided weakness.  She saw her primary care physician who ordered a CT scan and increase her blood pressure medications.  She was not on any antithrombotics at the time and no antithrombotics were added.  The right side weakness continue to improve until yesterday around 1 PM when she looked in the mirror and \"something was not right\".  She thinks her speech was also affected and she had trouble coming up with words.  By about 5 PM she was better but that's when she sought medical attention.  She feels almost back to normal today.  She denies any other stroke or TIA symptoms.  She denies any headache.  She has never smoked and does not do drugs or use any over-the-counter medications other than vitamin C.  He has been treated in the past with Lipitor and Zocor which causes her muscle pain.    Past Medical/Surgical Hx:  Past Medical History:   Diagnosis Date   • Hypertension    • TIA (transient ischemic attack)      Past Surgical History:   Procedure Laterality Date   • TONSILLECTOMY     • TUBAL ABDOMINAL LIGATION     • WISDOM TOOTH EXTRACTION       Review of Systems:        A complete review of all systems is negative except as described above plus Rashes, occasional night sweats.    Medications On Admission  Prescriptions Prior to Admission   Medication Sig Dispense Refill Last Dose   • CloNIDine (CATAPRES) 0.2 MG tablet Take 0.2 mg by mouth 2 (Two) Times a Day.      • diltiaZEM CD (CARDIZEM CD) 240 MG 24 hr capsule Take 240 mg by mouth Daily.      • losartan-hydrochlorothiazide (HYZAAR) 100-25 MG per tablet Take 1 tablet by mouth Daily.          Allergies:  Allergies "   Allergen Reactions   • Budesonide    • Neomycin-Bacitracin Zn-Polymyx Rash   • Vitamin E Rash       Social Hx:  Social History     Social History   • Marital status: Single     Spouse name: N/A   • Number of children: N/A   • Years of education: N/A     Occupational History   • Not on file.     Social History Main Topics   • Smoking status: Never Smoker   • Smokeless tobacco: Not on file   • Alcohol use No   • Drug use: No   • Sexual activity: Not on file     Other Topics Concern   • Not on file     Social History Narrative   • No narrative on file       Family Hx:  History reviewed. No pertinent family history.    Review of Imaging (Interpretation of images not reports):  CT brain 1/3/18: No acute stroke mass or hemorrhage, there are multiple areas of encephalomalacia in the left MCA NAFISA border zone with some areas involving the cortex, is an old stroke in the left head of the caudate, old strokes in the right centrum semiovale ovale, is calcification of the intracranial vertebral arteries  CT Brain 12/22/17: Similar to the more recent scan but the left frontal white matter lesion appears to be new  MRI brain 1/4/18: Multiple acute strokes in the left MCA territory as well as a left MCA NAFISA border zone, flair shows moderate to mild periventricular white matter disease, SWI sequences are negative postcontrast images negative MRA of the aortic arch is poor quality due to patient motion, both internal carotid arteries are patent although severely tortuous with some areas of dilation particularly distally in the cervical ICAs right worst than left suggestive of fibromuscular dysplasia, the right vertebral artery is patent but the left is absent, MRA [place] shows a severe flow-limiting stenosis of the distal left middle cerebral artery, is also severe stenosis of the mid-basilar artery, there are diffuse irregularities of the anterior cerebral arteries distally  Laboratory Results:   Lab Results   Component  "Value Date    GLUCOSE 97 01/04/2018    CALCIUM 9.0 01/04/2018     01/04/2018    K 3.5 01/04/2018    CO2 30.2 (H) 01/04/2018     01/04/2018    BUN 8 01/04/2018    CREATININE 0.65 01/04/2018    EGFRIFNONA 91 01/04/2018    BCR 12.3 01/04/2018    ANIONGAP 9.8 01/04/2018     Lab Results   Component Value Date    WBC 9.78 01/04/2018    HGB 14.7 01/04/2018    HCT 45.8 (H) 01/04/2018    MCV 93.7 01/04/2018     01/04/2018     Lab Results   Component Value Date    LDLCALC 139 (H) 01/04/2018     Lab Results   Component Value Date    HGBA1C 5.60 01/04/2018     Lab Results   Component Value Date    INR 0.95 01/03/2018    PROTIME 12.3 01/03/2018   EKG: Sinus rhythm    Physical Examination:  BP (!) 197/91  Pulse 53  Temp 98.2 °F (36.8 °C) (Oral)   Resp 17  Ht 152.4 cm (60\")  Wt 77.1 kg (170 lb)  SpO2 94%  BMI 33.2 kg/m2  General Appearance:   Well developed, Moderate abdominal obesity, well groomed, alert, and cooperative.  HEENT: Normocephalic.  Abnormal fundoscopic exam with AV nicking but discs are flat with sharp margins.  Neck and Spine: Normal range of motion.  Normal alignment. No mass or tenderness.  Bilateral supraclavicular bruits.  Cardiac: Regular rate and rhythm.  3 to 4/6 systolic murmur RUSB.  Peripheral Vasculature: Radial and pedal pulses are equal and symmetric. No signs of distal embolization.  Extremities:    No edema or deformities. Normal joint ROM. CINDY hoses and SCD's in place  Skin:    No rashes or birth marks.    Neurological examination:  Higher Integrative  Function: Oriented to Jan 1906, place and person. Normal registration, recall, attention span and concentration.  Mildly abnormal language with subtle decrease in comprehension, slightly slow and effortful spontaneous speech, normal repetition, reading, occasional paraphasic error on writing, naming. No neglect with normal visual-spatial function and construction. Normal fund of knowledge and higher integrative function.  CN " II: Pupils are equal, round, and reactive to light. Normal visual acuity and visual fields.    CN III IV VI: Extraocular movements are full without nystagmus.   CN V: Normal facial sensation and strength of muscles of mastication.  CN VII: Facial movements are symmetric although there is mild blunting of the right nasolabial fold  CN VIII:   Auditory acuity is normal.  CN IX & X:   Symmetric palatal movement.  CN XI: Sternocleidomastoid and trapezius are normal.  No weakness.  CN XII:   The tongue is midline.  No atrophy or fasciculations.  Motor: Normal muscle strength, bulk and tone in upper and lower extremities except for right finger extensors and intrinsic hand muscles are 4 out of 5.  Mild pronator drift with slowness of fine finger movements.  No fasciculations, rigidity, spasticity, or abnormal movements.  Reflexes: 2+ in the L and 3+ R upper and lower extremities. Plantar responses are flexor on the left and extensor on the right.  Sensation: Normal to light touch, pinprick, vibration, temperature, and proprioception in arms and legs.  Decreased graphesthesia on the right but no extinction on DSS.  Station and Gait: Normal gait and station.    Coordination: Finger to nose test shows no dysmetria.  Rapid alternating movements are normal.  Heel to shin normal.  NIHSS: 4    Diagnoses / Discussion:  67 y.o. who presents with Sx of right-sided weakness and aphasia now improving.  On examination she has subtle language dysfunction and right-sided weakness consistent with a left middle cerebral artery territory dysfunction.  MRI confirms multiple strokes in left middle cerebral artery cortex as well as the left MCA NAFISA border zone.  MRA shows multiple intracranial stenoses particularly in the left middle cerebral artery as well as the basilar artery.  The prognosis for neurological recovery is good.  The strokes are most likely due to intracranial atherosclerosis due to chronically untreated hypertension as well  as hyperlipidemia.  CNS vasculitis is unlikely as she does not have headaches.  The left MCA stenosis is flow-limiting and I am very concerned that she has a high risk of major stroke.  Since she has not been on any medical therapy I do not advocate for endovascular treatment at this time rather she should have maximal medical treatment.  If she should fail maximal medical therapy then middle cerebral artery and capacity stenting may be considered.  She will need control of risk factors I would lower her blood pressure very slowly to avoid worsening her ischemia.    Plan:  Aspirin 325mg  Plavix 300 mg by mouth stat  Plavix 75mg  Hydrate  Neurochecks  Mobilize slowly and monitor for worsening of symptoms  Check Lipid panel, Hgb A1C  Crestor 10mg along with CoQ10 and titrate up to 40 mg as soon as possible  Non-pharmacological DVT prophylaxis  TTE  TCD w breath holding  EKG Tele  PT/OT/ST  Stroke Education  Blood pressure control to <130/80  Goal LDL <70-recommend high dose statins-   Serum glucose < 140     Call 911 for stroke any stroke symptoms    I have discussed the above with the patient.  Time spent with patient: 60min    MDM  Reviewed: vitals  Interpretation: CT scan, MRI, labs and ECG      Dictated using Dragon dictation.

## 2018-01-04 NOTE — PLAN OF CARE
Problem: Patient Care Overview (Adult)  Goal: Plan of Care Review  Outcome: Ongoing (interventions implemented as appropriate)   01/04/18 0505   Coping/Psychosocial Response Interventions   Plan Of Care Reviewed With patient   Patient Care Overview   Progress improving   Outcome Evaluation   Outcome Summary/Follow up Plan NIH 2, MRI, MRA and echo today. neuro to see. continue to monitor.      Goal: Adult Individualization and Mutuality  Outcome: Ongoing (interventions implemented as appropriate)    Goal: Discharge Needs Assessment  Outcome: Ongoing (interventions implemented as appropriate)      Problem: Stroke (Ischemic) (Adult)  Goal: Signs and Symptoms of Listed Potential Problems Will be Absent or Manageable (Stroke)  Outcome: Ongoing (interventions implemented as appropriate)

## 2018-01-04 NOTE — SIGNIFICANT NOTE
01/04/18 0956   Rehab Treatment   Discipline speech language pathologist   Rehab Evaluation   Evaluation Not Performed patient unavailable for evaluation  (Pt NEEL for testing.SLP to try in PM.)

## 2018-01-04 NOTE — PAYOR COMM NOTE
"UR CONTACT:  NAI                 P: [phone]  F: [phone]        Roberta Houston (67 y.o. Female)     Date of Birth Social Security Number Address Home Phone MRN    1950  [address] [place] [address] [phone] [id number]    Roman Catholic Marital Status          [facility] Single       Admission Date Admission Type Admitting Provider Attending Provider Department, Room/Bed    1/3/18 Emergency Vincent Hanson MD Edling, Stephen A, MD [facility] 5 [place], P578/1    Discharge Date Discharge Disposition Discharge Destination                      Attending Provider: Vincent Hanson MD     Allergies:  Budesonide, Neomycin-bacitracin Zn-polymyx, Vitamin E    Isolation:  None   Infection:  None   Code Status:  FULL    Ht:  152.4 cm (60\")   Wt:  77.1 kg (170 lb)    Admission Cmt:  None   Principal Problem:  Acute CVA (cerebrovascular accident) [I63.9]                 Active Insurance as of 1/3/2018     Primary Coverage     Payor Plan Insurance Group Employer/Plan Group    [employer] [id number]     Payor Plan Address Payor Plan Phone Number Effective From Effective To    [address] [phone] 1/1/2018     [place] [address]       Subscriber Name Subscriber Birth Date Member ID       ROBERTA HOUSTON 1950 [id number]           Secondary Coverage     Payor Plan Insurance Group Employer/Plan Group    MEDICARE MEDICARE A ONLY      Payor Plan Address Payor Plan Phone Number Effective From Effective To    [address] [phone] 9/1/2017     [place] [address]       Subscriber Name Subscriber Birth Date Member ID       ROBERTA HOUSTON 1950 [id number]                 Emergency Contacts      (Rel.) Home Phone Work Phone Mobile Phone    Shanda Julio (Daughter) [phone] -- [phone]               History & Physical      Vinicio Ruiz MD at 1/4/2018  8:41 AM          Internal medicine history and physical  "   INTERNAL MEDICINE   [facility]       Patient Identification:  Name: Roberta Houston  Age: 67 y.o.  Sex: female  :  1950  MRN: [id number]                   Primary Care Physician: JENNIFER Cintron                                   Chief Complaint:  Right-sided facial droop since 1 PM yesterday, right-sided weakness for the last 3 weeks.    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  She has blood pressure since her 20s but has not been any medications for the last 5 years for reason unclear she does so that she couldn't tolerate Zocor or Lipitor because of severe back pain.  In that background she presented to her primary care physician on 2017 with one-week history of sudden weakness of the right leg and right arm.  At that time her blood pressure was recorded as 220/150 with heart rate of 61.  BMI was recorded as 33.98. By the time she saw the physician on 2017 the weakness has been steadily improving.  During that visit patient was started on diltiazem and losartan/hydrochlorothiazide and CT scan of the head stroke protocol was ordered.  It was finally done on 2017 and it revealed: Multiple lacunar infarcts involving the right middle of the cerebral hemisphere bilaterally.  No obvious acute infarct noted.  Area in the left parietal lobe was more prominent.  10 days after her initial visit with her primary care physician on 2017 patient had a follow-up with her primary care physician on 2017 was found to have blood pressure improved to 220/104.  Clonidine was added to her regimen at a dose of 0.2 mg twice daily.  Patient was given a follow-up appointment on 2017.  Patient has done well since the  and was able to go to work and worked 3 shifts in a row.  She works night shift as a respiratory therapist and  was able to carry out her tasks without any significant impairment.  She had 2 days off after the 3 shifts in a row, and felt very exhausted on Monday and Tuesday and slept most of the time.  Yesterday she woke up and was getting ready to quit to work in the afternoon and did notice that her face was not feeling right.  She is not sure when was the first time she noticed that about 1:00 it was very prominent.  She called her daughter about this facial droop and came to the emergency room for further evaluation.  She denies any worsening in her right arm and leg weakness and clearly say is that it is better than what it was 3 weeks ago.  She denies any chest pain shortness of breath nausea vomiting or diarrhea.  She did have a short period of time a sensation that she couldn't understand and express herself very well but she doesn't have any problem at this time.      Past Medical History:  Past Medical History:   Diagnosis Date   • Hypertension    • TIA (transient ischemic attack)      Past Surgical History:  Past Surgical History:   Procedure Laterality Date   • TONSILLECTOMY     • TUBAL ABDOMINAL LIGATION     • WISDOM TOOTH EXTRACTION        Home Meds:  Prescriptions Prior to Admission   Medication Sig Dispense Refill Last Dose   • CloNIDine (CATAPRES) 0.2 MG tablet Take 0.2 mg by mouth 2 (Two) Times a Day.      • diltiaZEM CD (CARDIZEM CD) 240 MG 24 hr capsule Take 240 mg by mouth Daily.      • losartan-hydrochlorothiazide (HYZAAR) 100-25 MG per tablet Take 1 tablet by mouth Daily.        Current Meds:     Current Facility-Administered Medications:   •  aluminum-magnesium hydroxide-simethicone (MAALOX MAX) 400-400-40 MG/5ML suspension 7.5 mL, 7.5 mL, Oral, Q4H PRN, Mark Graham MD  •  aspirin tablet 325 mg, 325 mg, Oral, Daily, 325 mg at 01/04/18 0818 **OR** aspirin suppository 300 mg, 300 mg, Rectal, Daily, Mark Graham MD  •  atorvastatin (LIPITOR) tablet 80 mg, 80 mg, Oral, Nightly, Mark KELLY  MD Santos, 80 mg at 01/04/18 0214  •  bisacodyl (DULCOLAX) suppository 10 mg, 10 mg, Rectal, Daily PRN, Mrak Graham MD  •  CloNIDine (CATAPRES) tablet 0.2 mg, 0.2 mg, Oral, Q12H, Mark Graham MD, 0.2 mg at 01/04/18 0818  •  docusate sodium (COLACE) capsule 100 mg, 100 mg, Oral, BID PRN, Mark Graham MD  •  labetalol (NORMODYNE,TRANDATE) injection 20 mg, 20 mg, Intravenous, Q6H PRN, Mark Graham MD  •  ondansetron (ZOFRAN) injection 4 mg, 4 mg, Intravenous, Q6H PRN, Mark Graham MD  •  potassium chloride (MICRO-K) CR capsule 40 mEq, 40 mEq, Oral, PRN **OR** potassium chloride (KLOR-CON) packet 40 mEq, 40 mEq, Oral, PRN **OR** potassium chloride 10 mEq in 100 mL IVPB, 10 mEq, Intravenous, Q1H PRN, Mark Graham MD  •  sodium chloride 0.9 % flush 1-10 mL, 1-10 mL, Intravenous, PRN, Mark Graham MD  •  sodium chloride 0.9 % flush 10 mL, 10 mL, Intravenous, PRN, Marco Antonio Armas MD  Allergies:  Allergies   Allergen Reactions   • Budesonide    • Neomycin-Bacitracin Zn-Polymyx Rash   • Vitamin E Rash     Social History:   Social History   Substance Use Topics   • Smoking status: Never Smoker   • Smokeless tobacco: Not on file   • Alcohol use No      Family History:  History reviewed. No pertinent family history.       Review of Systems  See history of present illness and past medical history.   Constitutional: Negative for fever.   HENT: Negative for sore throat.    Eyes: Negative.    Respiratory: Negative for cough and shortness of breath.    Cardiovascular: Negative for chest pain.   Gastrointestinal: Negative for abdominal pain, diarrhea and vomiting.   Genitourinary: Negative for dysuria.   Musculoskeletal: Negative for neck pain.   Skin: Negative for rash.   Allergic/Immunologic: Negative.    Neurological: Positive for speech difficulty and weakness (right sided). Negative for numbness and headaches.   Hematological: Negative.    Psychiatric/Behavioral: Positive for  "confusion - now resolved    Vitals:   BP (!) 197/91  Pulse 53  Temp 98.2 °F (36.8 °C) (Oral)   Resp 17  Ht 152.4 cm (60\")  Wt 77.1 kg (170 lb)  SpO2 94%  BMI 33.2 kg/m2  I/O: No intake or output data in the 24 hours ending 01/04/18 0841  Exam:  General Appearance:    Alert, cooperative, no distress, appears stated age   Head:    Normocephalic, without obvious abnormality, atraumatic   Eyes:    PERRL, conjunctiva/corneas clear, EOM's intact, both eyes   Ears:    Normal external ear canals, both ears   Nose:   Nares normal, septum midline, mucosa normal, no drainage    or sinus tenderness   Throat:   Lips, tongue, gums normal; oral mucosa pink and moist   Neck:   Supple, symmetrical, trachea midline, no adenopathy;     thyroid:  no enlargement/tenderness/nodules; no carotid    bruit or JVD   Back:     Symmetric, no curvature, ROM normal, no CVA tenderness   Lungs:     Clear to auscultation bilaterally, respirations unlabored   Chest Wall:    No tenderness or deformity    Heart:    Regular rate and rhythm, S1 and S2 normal, no murmur, rub   or gallop   Abdomen:     Soft, non-tender, bowel sounds active all four quadrants,     no masses, no hepatomegaly, no splenomegaly   Extremities:   Extremities normal, atraumatic, no cyanosis or edema   Pulses:   Pulses palpable in all extremities; symmetric all extremities   Skin:   Skin color normal, Skin is warm and dry,  no rashes or palpable lesions   Neurologic:   Is flattening of the right nasolabial fold noted.  Her speech is slightly nasal and thick and she clearly says that her speech is definitely different than what she is accustomed to.  She has 4/5 strength in upper extremities on the right side.  She does not have significant weakness on the right leg.  Left side is essentially unremarkable.       Data Review:      I reviewed the patient's new clinical results.    Results from last 7 days  Lab Units 01/04/18  0442 01/03/18  1845   WBC 10*3/mm3 9.78 11.76* "   HEMOGLOBIN g/dL 14.7 15.2   PLATELETS 10*3/mm3 281 298       Results from last 7 days  Lab Units 01/04/18  0442 01/03/18  1845   SODIUM mmol/L 141 144   POTASSIUM mmol/L 3.5 3.1*   CHLORIDE mmol/L 101 103   CO2 mmol/L 30.2* 28.6   BUN mg/dL 8 8   CREATININE mg/dL 0.65 0.73   CALCIUM mg/dL 9.0 9.4   GLUCOSE mg/dL 97 110*       Assessment:  Active Hospital Problems (** Indicates Principal Problem)    Diagnosis Date Noted   • **Acute CVA (cerebrovascular accident) [I63.9] 01/03/2018   • HTN (hypertension) [I10] 01/04/2018   • Dyslipidemia [E78.5] 01/04/2018      Resolved Hospital Problems    Diagnosis Date Noted Date Resolved   No resolved problems to display.   Imaging studies and ECG reviewed    Plan:  Acute/subacute ischemic CVA with multiple vascular territory involvement as well as lacunar process - Plan:    -continue with blood pressure control as per stroke guidelines and the situation,    -aspirin and follow-up on neurology recommendation for further anticoagulation needs.   -Patient is intolerant to statins and has tried Lipitor and Zocor in the past with debilitating back pain and does not want to try statin anymore.  We'll consider niacin and fibric acid derivatives PCK9 inhibitors to address her obviously abnormal lipid profile in the context of acute CVA.     -Continue with physical therapy and follow up on echocardiogram results.  Hypertension poorly controlled - continue present combination of clonidine and Hyzaar and gradually bring the blood pressure down to acceptable level.  Dyslipidemia poorly controlled with statin intolerance due to back pain.  Discussed with neurology service would consider cardiology or endocrinology evaluation to manage this clinically significant and complicating hyperlipidemia in the context of statin intolerance.  Vinicio Ruiz MD   1/4/2018  8:41 AM  Much of this encounter note is an electronic transcription/translation of spoken language to printed text. The electronic  translation of spoken language may permit erroneous, or at times, nonsensical words or phrases to be inadvertently transcribed; Although I have reviewed the note for such errors, some may still exist       Electronically signed by Vinicio Ruiz MD at 1/4/2018  1:23 PM           Emergency Department Notes      Pako Albarran at 1/3/2018  6:15 PM          Pt gone to radiology before physically coming to the room.      Pako Albarran  01/03/18 1815       Electronically signed by Pako Albarran at 1/3/2018  6:15 PM      Marco Antonio Armas MD at 1/3/2018  6:39 PM           EMERGENCY DEPARTMENT ENCOUNTER    CHIEF COMPLAINT  Chief Complaint: Confusion and right sided weakness  History given by: Patient  History limited by: none  Room Number: 04/04  PMD: Beth Ralph, JENNIFER      HPI:  Pt is a 67 y.o. female who presents complaining of confusion and constant right sided weakness that began suddenly at 1300 upon waking up. Pt states she had no symptoms when getting off work at 0100 this morning. Pt confirms difficulty speaking, but denies numbness and headache. Pt states she had a similar episode 12/18 and saw PCP where she was put on medication for HTN. Pt states weakness never fully went away after episode 3 weeks ago. Pt states she has no kx of TIA or CVA.     Duration:  5 hours  Onset: sudden  Timing: constant  Location: right side  Radiation: none  Quality: confusion and weakness  Intensity/Severity: moderate  Progression: unchanged  Associated Symptoms: difficulty speaking  Aggravating Factors: none  Alleviating Factors: none  Previous Episodes: Pt had a similar episode on 12/18 and was seen by PCP.  Treatment before arrival: none    PAST MEDICAL HISTORY  Active Ambulatory Problems     Diagnosis Date Noted   • No Active Ambulatory Problems     Resolved Ambulatory Problems     Diagnosis Date Noted   • No Resolved Ambulatory Problems     Past Medical History:   Diagnosis Date   • Hypertension    • TIA (transient  ischemic attack)        PAST SURGICAL HISTORY  Past Surgical History:   Procedure Laterality Date   • TONSILLECTOMY     • TUBAL ABDOMINAL LIGATION     • WISDOM TOOTH EXTRACTION         FAMILY HISTORY  History reviewed. No pertinent family history.    SOCIAL HISTORY  Social History     Social History   • Marital status: Single     Spouse name: N/A   • Number of children: N/A   • Years of education: N/A     Occupational History   • Not on file.     Social History Main Topics   • Smoking status: Never Smoker   • Smokeless tobacco: Not on file   • Alcohol use No   • Drug use: No   • Sexual activity: Not on file     Other Topics Concern   • Not on file     Social History Narrative   • No narrative on file       ALLERGIES  Review of patient's allergies indicates no known allergies.    REVIEW OF SYSTEMS  Review of Systems   Constitutional: Negative for fever.   HENT: Negative for sore throat.    Eyes: Negative.    Respiratory: Negative for cough and shortness of breath.    Cardiovascular: Negative for chest pain.   Gastrointestinal: Negative for abdominal pain, diarrhea and vomiting.   Genitourinary: Negative for dysuria.   Musculoskeletal: Negative for neck pain.   Skin: Negative for rash.   Allergic/Immunologic: Negative.    Neurological: Positive for speech difficulty and weakness (right sided). Negative for numbness and headaches.   Hematological: Negative.    Psychiatric/Behavioral: Positive for confusion.   All other systems reviewed and are negative.      PHYSICAL EXAM  ED Triage Vitals   Temp Heart Rate Resp BP SpO2   01/03/18 1800 01/03/18 1800 01/03/18 1800 01/03/18 1802 01/03/18 1800   98.2 °F (36.8 °C) 90 18 205/103 98 %      Temp src Heart Rate Source Patient Position BP Location FiO2 (%)   01/03/18 1800 -- -- -- --   Tympanic           Physical Exam   Constitutional: She is oriented to person, place, and time and well-developed, well-nourished, and in no distress. No distress.   HENT:   Head: Normocephalic  and atraumatic.   Eyes: EOM are normal. Pupils are equal, round, and reactive to light.   Neck: Normal range of motion. Neck supple.   Cardiovascular: Normal rate, regular rhythm and normal heart sounds.    Pulmonary/Chest: Effort normal and breath sounds normal. No respiratory distress.   Abdominal: Soft. There is no tenderness. There is no rebound and no guarding.   Musculoskeletal: Normal range of motion. She exhibits no edema.   Neurological: She is alert and oriented to person, place, and time. She has normal sensation, normal strength and intact cranial nerves. She has a normal Finger-Nose-Finger Test and a normal Heel to Shin Test.   Stroke Scale: 1 for right leg drift.    Skin: Skin is warm and dry. No rash noted.   Psychiatric: Mood and affect normal.   Nursing note and vitals reviewed.      LAB RESULTS  Lab Results (last 24 hours)     Procedure Component Value Units Date/Time    CBC & Differential [[id number]] Collected:  01/03/18 1845    Specimen:  Blood Updated:  01/03/18 1951    Narrative:       The following orders were created for panel order CBC & Differential.  Procedure                               Abnormality         Status                     ---------                               -----------         ------                     CBC Auto Differential[[id number]]        Abnormal            Final result                 Please view results for these tests on the individual orders.    Comprehensive Metabolic Panel [[id number]] Collected:  01/03/18 1845    Specimen:  Blood Updated:  01/03/18 1946    Protime-INR [[id number]]  (Normal) Collected:  01/03/18 1845    Specimen:  Blood Updated:  01/03/18 2000     Protime 12.3 Seconds      INR 0.95    CBC Auto Differential [[id number]]  (Abnormal) Collected:  01/03/18 1845    Specimen:  Blood Updated:  01/03/18 1951     WBC 11.76 (H) 10*3/mm3      RBC 4.86 10*6/mm3      Hemoglobin 15.2 g/dL      Hematocrit 44.6 %      MCV 91.8 fL      MCH 31.3 pg      MCHC 34.1  g/dL      RDW 13.4 (H) %      RDW-SD 44.1 fl      MPV 10.1 fL      Platelets 298 10*3/mm3      Neutrophil % 66.7 %      Lymphocyte % 20.9 %      Monocyte % 8.9 %      Eosinophil % 2.9 %      Basophil % 0.3 %      Immature Grans % 0.3 %      Neutrophils, Absolute 7.84 10*3/mm3      Lymphocytes, Absolute 2.46 10*3/mm3      Monocytes, Absolute 1.05 10*3/mm3      Eosinophils, Absolute 0.34 10*3/mm3      Basophils, Absolute 0.03 10*3/mm3      Immature Grans, Absolute 0.04 (H) 10*3/mm3           I ordered the above labs and reviewed the results    RADIOLOGY  XR Chest 1 View   Final Result   No focal pulmonary consolidation. Enlarged cardiac   silhouette. Tortuous aorta. Follow-up/further evaluation can be obtained   as indicated.       This report was finalized on 1/3/2018 6:47 PM by Dr. Raciel Devries MD.          CT Head Without Contrast Stroke Protocol    (Results Pending)  Several old left infarcts, but one subacute infarct in left frontal lobe.         I ordered the above noted radiological studies. Interpreted by radiologist. Discussed with radiologist (Greyson). Reviewed by me in PACS.       PROCEDURES  Procedures  EKG           EKG time: 1901  Rhythm/Rate: SR 63  P waves and MO: nml  QRS, axis: left axis deviation, LVH   ST and T waves: nonspecific T wave changes laterally     Interpreted Contemporaneously by me, independently viewed  No prior for comparison.     PROGRESS AND CONSULTS  ED Course     1807  Labs, CXR, and CT Head ordered for further evaluation.     1903   mg and Normodyne ordered for symptom management.     2023  Call placed to [facility].     2044  Discussed pt's case with Dr. Graham ([facility]) who agreed to admit pt to telemetry due to CVA and symptoms.       MEDICAL DECISION MAKING  Results were reviewed/discussed with the patient and they were also made aware of online access. Pt also made aware that some labs, such as cultures, will not be resulted during ER visit and follow up with PMD is  necessary.     MDM  Number of Diagnoses or Management Options  Acute CVA (cerebrovascular accident):   Essential hypertension:   Diagnosis management comments: Patient states she's had right-sided weakness for about 2 weeks.  She had a head CT done at that time which showed multiple small infarcts.  She woke up today around 1 PM and noticed increased weakness in her right arm and leg.  She was not a TPA candidate because her symptoms have been present for over 12 hours.  Her initial NIH score was 1.  Head CT showed a new left sided infarct.  Patient was given an aspirin.  She was hypertensive and was given a dose of IV labetalol.  She states her blood pressure has been elevated recently.  She was recently started on several new blood pressure meds.  Case was discussed with Dr. Graham and he agreed to admit the patient.       Amount and/or Complexity of Data Reviewed  Clinical lab tests: ordered and reviewed (WBC - 11.76)  Tests in the radiology section of CPT®:  ordered and reviewed (CXR- no focal pulmonary consolidation, enlarged cardiac silhouette, tortuous aorta.     CT Head - several old left infarcts, subacute infarct in left frontal lobe. )  Tests in the medicine section of CPT®:  ordered and reviewed (See note. )  Review and summarize past medical records: yes (12/22/17 - CT Head - showed atrophy, multiple small infarct, no acute infarct.     Pt Is not a TPA candidate. Symptoms have been present for greater than 12 hours. )  Discuss the patient with other providers: yes (Dr. Graham ([facility]))          NIHSS (NIH Stroke Scale/Score) reviewed and/or performed as part of the patient evaluation and treatment planning process.  The result associated with this review/performance is: 1      DIAGNOSIS  Final diagnoses:   None       DISPOSITION  ADMISSION    Discussed treatment plan and reason for admission with pt/family and admitting physician.  Pt/family voiced understanding of the plan for admission for further  "testing/treatment as needed.       Latest Documented Vital Signs:  As of 8:03 PM  BP- 169/86 HR- 55 Temp- 98.2 °F (36.8 °C) (Tympanic) O2 sat- 97%    --  Documentation assistance provided by alie Pacheco for Dr. Armas.  Information recorded by the scribe was done at my direction and has been verified and validated by me.                 Jenifer Pacheco  01/03/18 2045       Marco Antonio Armas MD  01/03/18 2257       Electronically signed by Marco Antonio Armas MD at 1/3/2018 10:57 PM        Lines, Drains & Airways    Active LDAs     Name:   Placement date:   Placement time:   Site:   Days:    Peripheral IV Line - Single Lumen 01/03/18 1844 median cubital vein (antecubital fossa), right 20 gauge  01/03/18 1844      less than 1         Inactive LDAs     None                Hospital Medications (all)       Dose Frequency Start End    aluminum-magnesium hydroxide-simethicone (MAALOX MAX) 400-400-40 MG/5ML suspension 7.5 mL 7.5 mL Every 4 Hours PRN 1/4/2018     Sig - Route: Take 7.5 mL by mouth Every 4 (Four) Hours As Needed for Indigestion or Heartburn. - Oral    aspirin suppository 300 mg 300 mg Daily 1/4/2018     Sig - Route: Insert 1 suppository into the rectum Daily. - Rectal    Linked Group 1:  \"Or\" Linked Group Details        aspirin tablet 325 mg 325 mg Once 1/3/2018 1/3/2018    Sig - Route: Take 1 tablet by mouth 1 (One) Time. - Oral    aspirin tablet 325 mg 325 mg Daily 1/4/2018     Sig - Route: Take 1 tablet by mouth Daily. - Oral    Linked Group 1:  \"Or\" Linked Group Details        atorvastatin (LIPITOR) tablet 80 mg 80 mg Nightly 1/4/2018     Sig - Route: Take 1 tablet by mouth Every Night. - Oral    bisacodyl (DULCOLAX) suppository 10 mg 10 mg Daily PRN 1/4/2018     Sig - Route: Insert 1 suppository into the rectum Daily As Needed for Constipation. - Rectal    CloNIDine (CATAPRES) tablet 0.2 mg 0.2 mg Every 12 Hours Scheduled 1/4/2018     Sig - Route: Take 2 tablets by mouth Every 12 (Twelve) " "Hours. - Oral    docusate sodium (COLACE) capsule 100 mg 100 mg 2 Times Daily PRN 1/4/2018     Sig - Route: Take 1 capsule by mouth 2 (Two) Times a Day As Needed for Constipation. - Oral    gadobenate dimeglumine (MULTIHANCE) injection 16 mL 16 mL Once in Imaging 1/4/2018 1/4/2018    Sig - Route: Infuse 16 mL into a venous catheter Once. - Intravenous    hydrochlorothiazide (HYDRODIURIL) tablet 25 mg 25 mg Daily 1/4/2018     Sig - Route: Take 1 tablet by mouth Daily. - Oral    Linked Group 2:  \"And\" Linked Group Details        labetalol (NORMODYNE,TRANDATE) injection 10 mg 10 mg Once 1/3/2018 1/3/2018    Sig - Route: Infuse 2 mL into a venous catheter 1 (One) Time. - Intravenous    labetalol (NORMODYNE,TRANDATE) injection 20 mg 20 mg Every 6 Hours PRN 1/4/2018     Sig - Route: Infuse 4 mL into a venous catheter Every 6 (Six) Hours As Needed for High Blood Pressure (SBP > 220). - Intravenous    LORazepam (ATIVAN) tablet 1 mg 1 mg Once As Needed 1/4/2018 1/4/2018    Sig - Route: Take 1 tablet by mouth 1 (One) Time As Needed for Anxiety (administer prior to MRI). - Oral    losartan (COZAAR) tablet 100 mg 100 mg Every 24 Hours Scheduled 1/4/2018     Sig - Route: Take 1 tablet by mouth Daily. - Oral    Linked Group 2:  \"And\" Linked Group Details        ondansetron (ZOFRAN) injection 4 mg 4 mg Every 6 Hours PRN 1/4/2018     Sig - Route: Infuse 2 mL into a venous catheter Every 6 (Six) Hours As Needed for Nausea or Vomiting. - Intravenous    potassium chloride (KLOR-CON) packet 40 mEq 40 mEq As Needed 1/4/2018     Sig - Route: Take 40 mEq by mouth As Needed (potassium replacement, see admin instructions). - Oral    Linked Group 3:  \"Or\" Linked Group Details        potassium chloride (MICRO-K) CR capsule 40 mEq 40 mEq As Needed 1/4/2018     Sig - Route: Take 4 capsules by mouth As Needed (potassium replacement.  see admin instructions). - Oral    Linked Group 3:  \"Or\" Linked Group Details        potassium chloride 10 mEq in " "100 mL IVPB 10 mEq Every 1 Hour PRN 1/4/2018     Sig - Route: Infuse 100 mL into a venous catheter Every 1 (One) Hour As Needed (potassium protocol PERIPHERAL - see admin instructions). - Intravenous    Linked Group 3:  \"Or\" Linked Group Details        sodium chloride 0.9 % flush 1-10 mL 1-10 mL As Needed 1/4/2018     Sig - Route: Infuse 1-10 mL into a venous catheter As Needed for Line Care. - Intravenous    sodium chloride 0.9 % flush 10 mL 10 mL As Needed 1/3/2018     Sig - Route: Infuse 10 mL into a venous catheter As Needed for Line Care. - Intravenous    Cosign for Ordering: Accepted by Marco Antonio Armas MD on 1/3/2018  8:37 PM    sodium chloride 0.9 % infusion 125 mL/hr Continuous 1/4/2018     Sig - Route: Infuse 125 mL/hr into a venous catheter Continuous. - Intravenous    gadobenate dimeglumine (MULTIHANCE) injection 16 mL (Discontinued) 16 mL Once in Imaging 1/4/2018 1/4/2018    Sig - Route: Infuse 16 mL into a venous catheter Once. - Intravenous    losartan-hydrochlorothiazide (HYZAAR) 100-25 MG per tablet 1 tablet (Discontinued) 1 tablet Daily 1/4/2018 1/4/2018    Sig - Route: Take 1 tablet by mouth Daily. - Oral    Reason for Discontinue: Formulary change            Imaging Results (last 24 hours)     Procedure Component Value Units Date/Time    XR Chest 1 View [[id number]] Collected:  01/03/18 1845     Updated:  01/03/18 1850    Narrative:       XR CHEST 1 VW-     HISTORY: Female who is 67 years-old,  possible stroke     TECHNIQUE: Frontal view of the chest     COMPARISON: Frontal view of the chest     FINDINGS: Cardiac silhouette is enlarged, may reflect cardiomegaly  and/or pericardial effusion. Pulmonary vasculature is unremarkable.  Aorta is tortuous. No focal pulmonary consolidation, pleural effusion,  or pneumothorax. No acute osseous process.       Impression:       No focal pulmonary consolidation. Enlarged cardiac  silhouette. Tortuous aorta. Follow-up/further evaluation can be obtained  as " indicated.     This report was finalized on 1/3/2018 6:47 PM by Dr. Raciel Devries MD.       CT Head Without Contrast Stroke Protocol [[id number]] Collected:  01/03/18 1939     Updated:  01/04/18 0926    Narrative:       EMERGENCY NONCONTRAST HEAD CT 01/03/2018     CLINICAL HISTORY: Acute stroke, has some new onset of right-sided  weakness.     TECHNIQUE: Spiral CT images were obtained from the base of the skull to  the vertex without intravenous contrast and images were reformatted and  submitted in 3 mm thick axial CT section with brain algorithm.     COMPARISON: This correlated to a noncontrast head CT from [facility] just 12 days ago on 12/22/2017.     FINDINGS: There is a row of 3 separate old 4 to 5 mm superior left  frontoparietal subcortical white matter infarcts. They line up in a row  and they were present on prior head CT 12/22/2017 and may be in the  watershed territory between left anterior and middle cerebral artery  territories. Since the head CT 12 days ago on 12/22/2017 there has been  development of a faint 7 x 7 mm rounded area of low-density in the  anterolateral left frontal white matter 4 mm anterolateral to the  anterior body of the left lateral ventricle suggesting acute to subacute  white matter infarct that occurred in the 12 day interval. There is a  question subtle 7 x 3 mm area of low-density in the posterior superior  left frontal cortex on axial CT image 38, could be a tiny cortical  infarct at this site. There is a 4 mm old lacunar infarct at the  junction of the head and body of the left caudate nucleus. There is a  questionable tiny 4 mm old posterior-superior right frontal subcortical  white matter infarct. The remainder of the brain parenchyma is normal in  attenuation. Ventricles are normal in size. I see no mass effect and no  midline shift and no extra-axial fluid collections are identified. There  is no evidence of acute intracranial hemorrhage. The  paranasal sinuses  and the mastoid air cells and middle ear cavities are clear.       Impression:          1. Patient had a head CT from [facility] 12 days on  12/22/2017 and redemonstrated on this head CT are a row of 3 separate  old 4 to 5 mm posterior superior left frontal and anterior-superior left  parietal subcortical white matter infarcts that line up in a linear row  and could be old watershed territory infarcts between the left anterior  and middle cerebral artery territories that were potentially embolic  infarcts in the left middle cerebral artery territory. Furthermore,  since head CT 12 days ago on 12/22/2017 there has been development of a  7 x 7 mm faint area of low-density in the anterolateral left frontal  white matter, 4 mm anterolateral to the anterior body of the left  lateral ventricle suggesting acute to subacute white matter infarct in  either the left middle cerebral artery territory or the watershed  territory between the left anterior middle cerebral artery territory and  there is a questionable 7 x 3 mm acute infarct in the posterior  superolateral left frontal cortex. There is an additional 4 mm old  lacunar infarct in the junction of the head and body of the left caudate  nucleus and questionable 4 mm old posterior superior right frontal  subcortical white matter infarct.     2. The remainder of the head CT is within normal limits. I Recommend an  MRI of the brain to further characterize the apparent new infarcts  described when compared to head CT 12 days ago on 12/22/2017. The  results and recommendations were communicated to Dr. Bin Armas in  the emergency room by telephone 01/03/2018 at 6:50 PM.     Radiation dose reduction techniques were utilized, including automated  exposure control and exposure modulation based on body size.     This report was finalized on 1/4/2018 9:23 AM by Dr. Juarez Carlson MD.       MRI Angiogram Head Without Contrast [[id number]]  Collected:  01/04/18 1129     Updated:  01/04/18 1129    Narrative:       MRI OF THE BRAIN WITH AND WITHOUT CONTRAST, MRA OF THE HEAD WITHOUT  CONTRAST, AND MRA OF THE NECK WITH AND WITHOUT CONTRAST     CLINICAL HISTORY: Right-sided facial droop.     TECHNIQUE: MRI of the brain was obtained with sagittal T1, axial pre and  postgadolinium T1, coronal postgadolinium T1, axial FLAIR, axial T2,  axial diffusion, and axial susceptibility weighted images.     COMPARISON: There are no previous MRIs of the brain available for  comparison.     FINDINGS:  Multiple areas of acute infarction are identified within the lateral  aspect of the left frontal lobe, parietal lobe, occipital lobe, and  temporal lobe. These are within the left middle cerebral artery  distribution. The largest contiguous area within the left occipital lobe  measures up to 2.0 x 2.2 cm in greatest axial dimensions. The largest  area within the left frontal and parietal lobes measures up to 2.0 x 7.2  cm in greatest axial dimensions. Additionally, there are foci of  encephalomalacia within the left parietal lobe compatible with small  chronic infarcts. The largest of these areas measures up to  approximately 6 mm in diameter. Relatively mild changes of chronic small  vessel ischemic phenomena are noted. There is no evidence for  hemorrhagic transformation on the susceptibility weighted images. No  abnormal areas of contrast enhancement are identified. The ventricles,  sulci, and cisterns are age appropriate. The major intracranial flow  related signal voids are unremarkable.     There is fluid signal intensity within the mastoid air cells, left  greater than right. Mucosal thickening is seen within the ethmoid air  cells.       Impression:       Multiple areas of acute infarction within the lateral aspect of the left  frontal, parietal, occipital, and temporal lobes within the left MCA  distribution. No evidence for hemorrhagic transformation and  no  significant mass effect is seen.     2 subcentimeter foci of encephalomalacia within the left parietal lobe  compatible with small chronic infarcts.     Mild changes of chronic small vessel ischemic phenomena.        TECHNIQUE: MRA of the head was obtained with 3D time-of-flight imaging  technique. Maximum intensity projection reconstructed images were  obtained.     FINDINGS:  There is severely diminished flow related enhancement within the mid  portion of the basilar artery compatible with a severe stenosis.  Additional areas of mild-to-moderate to moderate stenoses are seen  throughout the basilar artery. Atherosclerotic irregularity is noted  within the dominant right vertebral artery. The left vertebral artery  demonstrates no flow related enhancement within the proximal  intracranial segment. 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 There is some apparent reconstitution of the distal  portion of the intracranial segment of the left vertebral artery via the  vertebrobasilar junction. Multiple areas of relatively distal posterior  cerebral artery stenoses are also appreciated.     The internal carotids demonstrate normal flow related enhancement. The  right middle cerebral artery is unremarkable in appearance. There is  severely diminished flow related enhancement within the left M1 segment  which likely correlates to a severe degree of stenosis. Within the  proximal aspect of the right A2 segment, there is also severely  diminished flow related enhancement likely representative of a severe  stenosis. Additional areas of markedly diminished flow related  enhancement are identified within more distal aspects of the anterior  cerebrals probably representative of relatively severe distal NAFISA  stenoses.     IMPRESSION:  There are findings likely representative of a severe stenosis within the  mid basilar, a severe stenosis within the left M1 segment, a severe  stenosis within the  proximal right A2 segment, and other areas of  relatively severe stenoses involving distal anterior cerebral artery  branches.     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 Multiple areas of  relatively prominent distal posterior cerebral artery stenoses are also  appreciated.        TECHNIQUE: MRA of the neck was obtained with a combination of 3D  time-of-flight imaging as well as a contrast enhanced MRA. Maximum  intensity projection reconstructed images were obtained.     FINDINGS:  Although the great vessel origins are not well evaluated, there do  appear to be prominent stenoses involving the origins of the left  subclavian artery as well as the left common carotid. There is a beaded  appearance of cervical internal carotids suggestive of fibromuscular  dysplasia. Atherosclerotic changes are identified within both common  carotid bifurcations and proximal internal carotids. However, at most,  there is up to a 30% stenosis by NASCET criteria on either side. The  left vertebral artery has absent flow related enhancement and is likely  completely occluded although it could be critically stenotic. The right  vertebral artery is somewhat tortuous in appearance. The origin of the  right vertebral artery is not well defined. However, otherwise, normal  flow related enhancement is seen throughout the remaining cervical  segment of the right vertebral artery.     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 The right vertebral artery origin  is not well evaluated. Otherwise, the right vertebral artery is  relatively unremarkable in appearance.     At most, there is up to a 30% NASCET stenosis within either proximal  internal carotid  artery. Additionally, within the cervical segments of  both internal carotid arteries, there are likely areas of fibromuscular  dysplasia involvement.     Again, the great vessel origins are not well evaluated although there  may be up to relatively severe degrees of stenoses involving the left  subclavian artery and the left common carotid origins. These findings  could be further characterized with CT angiography if clinically  indicated.     I am attempting to discuss these findings and Dr. Graham on 01/04/2018  at approximately 10:45 AM.       MRI Angiogram Neck With & Without Contrast [[id number]] Collected:  01/04/18 1129     Updated:  01/04/18 1129    Narrative:       MRI OF THE BRAIN WITH AND WITHOUT CONTRAST, MRA OF THE HEAD WITHOUT  CONTRAST, AND MRA OF THE NECK WITH AND WITHOUT CONTRAST     CLINICAL HISTORY: Right-sided facial droop.     TECHNIQUE: MRI of the brain was obtained with sagittal T1, axial pre and  postgadolinium T1, coronal postgadolinium T1, axial FLAIR, axial T2,  axial diffusion, and axial susceptibility weighted images.     COMPARISON: There are no previous MRIs of the brain available for  comparison.     FINDINGS:  Multiple areas of acute infarction are identified within the lateral  aspect of the left frontal lobe, parietal lobe, occipital lobe, and  temporal lobe. These are within the left middle cerebral artery  distribution. The largest contiguous area within the left occipital lobe  measures up to 2.0 x 2.2 cm in greatest axial dimensions. The largest  area within the left frontal and parietal lobes measures up to 2.0 x 7.2  cm in greatest axial dimensions. Additionally, there are foci of  encephalomalacia within the left parietal lobe compatible with small  chronic infarcts. The largest of these areas measures up to  approximately 6 mm in diameter. Relatively mild changes of chronic small  vessel ischemic phenomena are noted. There is no evidence for  hemorrhagic transformation on  the susceptibility weighted images. No  abnormal areas of contrast enhancement are identified. The ventricles,  sulci, and cisterns are age appropriate. The major intracranial flow  related signal voids are unremarkable.     There is fluid signal intensity within the mastoid air cells, left  greater than right. Mucosal thickening is seen within the ethmoid air  cells.       Impression:       Multiple areas of acute infarction within the lateral aspect of the left  frontal, parietal, occipital, and temporal lobes within the left MCA  distribution. No evidence for hemorrhagic transformation and no  significant mass effect is seen.     2 subcentimeter foci of encephalomalacia within the left parietal lobe  compatible with small chronic infarcts.     Mild changes of chronic small vessel ischemic phenomena.        TECHNIQUE: MRA of the head was obtained with 3D time-of-flight imaging  technique. Maximum intensity projection reconstructed images were  obtained.     FINDINGS:  There is severely diminished flow related enhancement within the mid  portion of the basilar artery compatible with a severe stenosis.  Additional areas of mild-to-moderate to moderate stenoses are seen  throughout the basilar artery. Atherosclerotic irregularity is noted  within the dominant right vertebral artery. The left vertebral artery  demonstrates no flow related enhancement within the proximal  intracranial segment. 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 There is some apparent reconstitution of the distal  portion of the intracranial segment of the left vertebral artery via the  vertebrobasilar junction. Multiple areas of relatively distal posterior  cerebral artery stenoses are also appreciated.     The internal carotids demonstrate normal flow related enhancement. The  right middle cerebral artery is unremarkable in appearance. There is  severely diminished flow related enhancement within the left M1  segment  which likely correlates to a severe degree of stenosis. Within the  proximal aspect of the right A2 segment, there is also severely  diminished flow related enhancement likely representative of a severe  stenosis. Additional areas of markedly diminished flow related  enhancement are identified within more distal aspects of the anterior  cerebrals probably representative of relatively severe distal NAFISA  stenoses.     IMPRESSION:  There are findings likely representative of a severe stenosis within the  mid basilar, a severe stenosis within the left M1 segment, a severe  stenosis within the proximal right A2 segment, and other areas of  relatively severe stenoses involving distal anterior cerebral artery  branches.     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 Multiple areas of  relatively prominent distal posterior cerebral artery stenoses are also  appreciated.        TECHNIQUE: MRA of the neck was obtained with a combination of 3D  time-of-flight imaging as well as a contrast enhanced MRA. Maximum  intensity projection reconstructed images were obtained.     FINDINGS:  Although the great vessel origins are not well evaluated, there do  appear to be prominent stenoses involving the origins of the left  subclavian artery as well as the left common carotid. There is a beaded  appearance of cervical internal carotids suggestive of fibromuscular  dysplasia. Atherosclerotic changes are identified within both common  carotid bifurcations and proximal internal carotids. However, at most,  there is up to a 30% stenosis by NASCET criteria on either side. The  left vertebral artery has absent flow related enhancement and is likely  completely occluded although it could be critically stenotic. The  right  vertebral artery is somewhat tortuous in appearance. The origin of the  right vertebral artery is not well defined. However, otherwise, normal  flow related enhancement is seen throughout the remaining cervical  segment of the right vertebral artery.     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 The right vertebral artery origin  is not well evaluated. Otherwise, the right vertebral artery is  relatively unremarkable in appearance.     At most, there is up to a 30% NASCET stenosis within either proximal  internal carotid artery. Additionally, within the cervical segments of  both internal carotid arteries, there are likely areas of fibromuscular  dysplasia involvement.     Again, the great vessel origins are not well evaluated although there  may be up to relatively severe degrees of stenoses involving the left  subclavian artery and the left common carotid origins. These findings  could be further characterized with CT angiography if clinically  indicated.     I am attempting to discuss these findings and Dr. Graham on 01/04/2018  at approximately 10:45 AM.       MRI Brain With & Without Contrast [[id number]] Collected:  01/04/18 1129     Updated:  01/04/18 1129    Narrative:       MRI OF THE BRAIN WITH AND WITHOUT CONTRAST, MRA OF THE HEAD WITHOUT  CONTRAST, AND MRA OF THE NECK WITH AND WITHOUT CONTRAST     CLINICAL HISTORY: Right-sided facial droop.     TECHNIQUE: MRI of the brain was obtained with sagittal T1, axial pre and  postgadolinium T1, coronal postgadolinium T1, axial FLAIR, axial T2,  axial diffusion, and axial susceptibility weighted images.     COMPARISON: There are no previous MRIs of the brain available for  comparison.     FINDINGS:  Multiple areas of acute infarction are identified within the lateral  aspect of the left frontal lobe, parietal lobe, occipital lobe, and  temporal lobe. These are within the left  middle cerebral artery  distribution. The largest contiguous area within the left occipital lobe  measures up to 2.0 x 2.2 cm in greatest axial dimensions. The largest  area within the left frontal and parietal lobes measures up to 2.0 x 7.2  cm in greatest axial dimensions. Additionally, there are foci of  encephalomalacia within the left parietal lobe compatible with small  chronic infarcts. The largest of these areas measures up to  approximately 6 mm in diameter. Relatively mild changes of chronic small  vessel ischemic phenomena are noted. There is no evidence for  hemorrhagic transformation on the susceptibility weighted images. No  abnormal areas of contrast enhancement are identified. The ventricles,  sulci, and cisterns are age appropriate. The major intracranial flow  related signal voids are unremarkable.     There is fluid signal intensity within the mastoid air cells, left  greater than right. Mucosal thickening is seen within the ethmoid air  cells.       Impression:       Multiple areas of acute infarction within the lateral aspect of the left  frontal, parietal, occipital, and temporal lobes within the left MCA  distribution. No evidence for hemorrhagic transformation and no  significant mass effect is seen.     2 subcentimeter foci of encephalomalacia within the left parietal lobe  compatible with small chronic infarcts.     Mild changes of chronic small vessel ischemic phenomena.        TECHNIQUE: MRA of the head was obtained with 3D time-of-flight imaging  technique. Maximum intensity projection reconstructed images were  obtained.     FINDINGS:  There is severely diminished flow related enhancement within the mid  portion of the basilar artery compatible with a severe stenosis.  Additional areas of mild-to-moderate to moderate stenoses are seen  throughout the basilar artery. Atherosclerotic irregularity is noted  within the dominant right vertebral artery. The left vertebral artery  demonstrates  no flow related enhancement within the proximal  intracranial segment. 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 There is some apparent reconstitution of the distal  portion of the intracranial segment of the left vertebral artery via the  vertebrobasilar junction. Multiple areas of relatively distal posterior  cerebral artery stenoses are also appreciated.     The internal carotids demonstrate normal flow related enhancement. The  right middle cerebral artery is unremarkable in appearance. There is  severely diminished flow related enhancement within the left M1 segment  which likely correlates to a severe degree of stenosis. Within the  proximal aspect of the right A2 segment, there is also severely  diminished flow related enhancement likely representative of a severe  stenosis. Additional areas of markedly diminished flow related  enhancement are identified within more distal aspects of the anterior  cerebrals probably representative of relatively severe distal NAFISA  stenoses.     IMPRESSION:  There are findings likely representative of a severe stenosis within the  mid basilar, a severe stenosis within the left M1 segment, a severe  stenosis within the proximal right A2 segment, and other areas of  relatively severe stenoses involving distal anterior cerebral artery  branches.     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 Multiple areas of  relatively prominent distal posterior cerebral artery stenoses are also  appreciated.        TECHNIQUE: MRA of the neck was obtained with a combination of 3D  time-of-flight imaging as well as a contrast enhanced MRA. Maximum  intensity projection reconstructed images were obtained.     FINDINGS:  Although the great vessel origins are  not well evaluated, there do  appear to be prominent stenoses involving the origins of the left  subclavian artery as well as the left common carotid. There is a beaded  appearance of cervical internal carotids suggestive of fibromuscular  dysplasia. Atherosclerotic changes are identified within both common  carotid bifurcations and proximal internal carotids. However, at most,  there is up to a 30% stenosis by NASCET criteria on either side. The  left vertebral artery has absent flow related enhancement and is likely  completely occluded although it could be critically stenotic. The right  vertebral artery is somewhat tortuous in appearance. The origin of the  right vertebral artery is not well defined. However, otherwise, normal  flow related enhancement is seen throughout the remaining cervical  segment of the right vertebral artery.     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 The right vertebral artery origin  is not well evaluated. Otherwise, the right vertebral artery is  relatively unremarkable in appearance.     At most, there is up to a 30% NASCET stenosis within either proximal  internal carotid artery. Additionally, within the cervical segments of  both internal carotid arteries, there are likely areas of fibromuscular  dysplasia involvement.     Again, the great vessel origins are not well evaluated although there  may be up to relatively severe degrees of stenoses involving the left  subclavian artery and the left common carotid origins. These findings  could be further characterized with CT angiography if clinically  indicated.     I am attempting to discuss these findings and Dr. Graham on 01/04/2018  at approximately 10:45 AM.              Speech Language Pathology Notes      Anupama Zapata at 1/4/2018  9:57 AM  Version 1 of 1 01/04/18 0956   Rehab Treatment   Discipline speech language pathologist    Rehab Evaluation   Evaluation Not Performed patient unavailable for evaluation  (Pt NEEL for testing.SLP to try in PM.)        Electronically signed by Anupama Zapata at 2018  9:57 AM      Anupama Zapata at 2018 11:28 AM  Version 1 of 1         Problem: Patient Care Overview (Adult)  Goal: Plan of Care Review  Outcome: Ongoing (interventions implemented as appropriate)   18 1126   Coping/Psychosocial Response Interventions   Plan Of Care Reviewed With patient   Patient Care Overview   Progress progress towards functional goals is fair   Outcome Evaluation   Outcome Summary/Follow up Plan Pt seen for bedside swallow eval. Wet voice, cough and throat clear with thin and mixed consistency. Pt had ativan for earlier MRI so still feeling lethargic. Rec reg diet, no mixed consistencies, nectar liquids, meds with puree. ST to re-eval .       Problem: Inpatient SLP  Goal: Dysphagia- Patient will safely consume diet as per recommendation with no signs/symptoms of aspiration  Outcome: Ongoing (interventions implemented as appropriate)   18 1126   Safely Consume Diet   Safely Consume Diet- SLP, Time to Achieve by discharge   Safely Consume Diet- SLP, Outcome goal ongoing            Electronically signed by Anupama Zapata at 2018 11:28 AM      Anupama Zapata at 2018  1:06 PM  Version 1 of 1         Acute Care - Speech Language Pathology   Swallow Initial Evaluation [facility]     Patient Name: Roberta Houston  : 1950  MRN: [id number]  Today's Date: 2018               Admit Date: 1/3/2018    SPEECH-LANGUAGE PATHOLOGY EVALUATION - SWALLOW  Subjective: The patient was seen on this date for a Clinical Swallow evaluation.  Patient was somnolent but cooperative. Pt had recently been given Ativan before MRI, so somewhat fatigued.  Significant history: Adm w/acute CVA with right facial droop and confusion.   Objective: Textures given included thin liquid, nectar  thick liquid, puree consistency, mechanical soft consistency and regular consistency.  Assessment: Difficulties were noted with thin liquid and mechanical soft consistency.  Observations: Pt w/mild oral residue with regular consistency which cleared with liquid wash. Wet voice, cough, and throat clear noted with thin liquids (small sips) as well as mixed consistency. Pt tolerated nectar thick liquids by straw w/ no overt s/s aspiration. Laryngeal elevation was appropriate and swallow was timely.   oral residue, throat clear, cough and wet vocal quality  SLP Findings:  Patient presents with mild to moderate oropharyngeal dysphagia, without esophageal component.   Recommendations: Diet Textures: nectar thick liquid, regular consistency with no mixed textures food.  Medications should be taken whole or crushed with puree. May have water and ice between meals after oral care, under staff or family supervision and with the recommended strategies for safe swallowing.   Recommended Strategies: Upright for PO, small bites and sips, may use straw and alternate liquids and solids. Oral care before breakfast, after all meals and PRN.  Other Recommended Evaluations: Re-evaluation at bedside    Dysphagia therapy is recommended. Rationale: monitor diet tolerance.        Visit Dx:     ICD-10-CM ICD-9-CM   1. Acute CVA (cerebrovascular accident) I63.9 434.91   2. Essential hypertension I10 401.9     Patient Active Problem List   Diagnosis   • Acute CVA (cerebrovascular accident)   • HTN (hypertension)   • Dyslipidemia     Past Medical History:   Diagnosis Date   • Hypertension    • TIA (transient ischemic attack)      Past Surgical History:   Procedure Laterality Date   • TONSILLECTOMY     • TUBAL ABDOMINAL LIGATION     • WISDOM TOOTH EXTRACTION            SWALLOW EVALUATION (last 72 hours)      Swallow Evaluation       01/04/18 1130                Rehab Evaluation    Document Type evaluation  -JT        Subjective Information  agree to therapy;complains of;fatigue  -JT        Patient Effort, Rehab Treatment adequate  -JT        Symptoms Noted During/After Treatment fatigue   pt had been given ativan prior to earlier MRI  -JT        General Information    Patient Profile Review yes  -JT        Subjective Patient Observations alert but fatigued  -JT        Pertinent History Of Current Problem Acute CVA w/Right facial droop and confusion  -JT        Current Diet Limitations regular solid;thin liquids  -JT        Precautions/Limitations, Hearing WNL  -JT        Prior Level of Function- Communication functional in all spheres  -JT        Prior Level of Function- Swallowing no diet consistency restrictions  -JT        Plans/Goals Discussed With patient;agreed upon  -JT        Barriers to Rehab none identified  -JT        Clinical Impression    Patient's Goals For Discharge patient did not state  -JT        SLP Swallowing Diagnosis mild dysphagia;oral dysfunction;pharyngeal dysfunction;moderate dysphagia  -JT        Rehab Potential/Prognosis, Swallowing good, to achieve stated therapy goals  -JT        Criteria for Skilled Therapeutic Interventions Met demonstrates skilled criteria for intervention  -JT        Therapy Frequency evaluation only  -JT        Predicted Duration Therapy Interv (days) until discharge  -JT        SLP Diet Recommendation regular textures;nectar/syrup-thick liquids   NO MIXED CONSISTENCIES  -JT        Recommended Diagnostics reassess via clinical swallow (non-instrumental exam)  -JT        Recommended Feeding/Eating Techniques no straws;small sips/bites  -JT        SLP Rec. for Method of Medication Administration meds whole in pudding/applesauce;meds crushed in pudding/applesauce  -JT        Monitor For Signs Of Aspiration cough;elevated WBC count;gurgly voice;throat clearing;fever;upper respiratory infection;pneumonia;right lower lobe infiltrates  -JT        Anticipated Discharge Disposition other (see comments)    unknown  -JT        Pain Assessment    Pain Assessment No/denies pain  -JT        Cognitive Assessment/Intervention    Current Cognitive/Communication Assessment functional  -JT        Orientation Status oriented to;person;place  -JT        Follows Commands/Answers Questions 100% of the time;able to follow single-step instructions  -JT        Oral Motor Structure and Function    Oral Motor Anatomy and Physiology patient demonstrates anatomy that is WNL  -JT        Dentition Assessment present and adequate  -JT        Secretion Management WNL/WFL  -JT        Mucosal Quality moist, healthy  -JT        Velar Elevation WNL (within normal limits)  -JT        Volitional Swallow no difficulties initiating volitional swallow  -JT        Volitional Cough weak volitional cough  -JT        Oral Musculature General Assessment oral labial impairment  -JT        Oral Labial Strength and Mobility right labial droop  -JT        Clinical Swallow Exam    Mode of Presentation fed by clinician;cup;spoon;straw  -JT        Oral Residue cohesive solid:;tongue residue   mild  -JT        Oral Phase Results intact oral phase without signs of dysfunction  -JT        Pharyngeal Phase Results cough;throat clear;sign/symptoms of pharyngeal impairment   wet vocal quality w/thin, mixed   -JT        Summary of Clinical Exam Pt w/ mild-mod oropharyngeal dysphagia characterized by general weakness, wet voice, cough and throat clear with thin and mixed consistency, and mild oral residue with regular solids.   -JT          User Key  (r) = Recorded By, (t) = Taken By, (c) = Cosigned By    Initials Name Effective Dates    JT Anupama Elmore Benny 12/11/15 -         EDUCATION  The patient has been educated in the following areas:   Dysphagia (Swallowing Impairment).    SLP Recommendation and Plan  SLP Swallowing Diagnosis: mild dysphagia, oral dysfunction, pharyngeal dysfunction, moderate dysphagia  SLP Diet Recommendation: regular textures,  nectar/syrup-thick liquids (NO MIXED CONSISTENCIES)  Recommended Feeding/Eating Techniques: no straws, small sips/bites  SLP Rec. for Method of Medication Administration: meds whole in pudding/applesauce, meds crushed in pudding/applesauce  Monitor For Signs Of Aspiration: cough, elevated WBC count, gurgly voice, throat clearing, fever, upper respiratory infection, pneumonia, right lower lobe infiltrates  Recommended Diagnostics: reassess via clinical swallow (non-instrumental exam)  Criteria for Skilled Therapeutic Interventions Met: demonstrates skilled criteria for intervention  Anticipated Discharge Disposition: other (see comments) (unknown)  Rehab Potential/Prognosis, Swallowing: good, to achieve stated therapy goals  Therapy Frequency: evaluation only             Plan of Care Review  Plan Of Care Reviewed With: patient  Progress: progress towards functional goals is fair  Outcome Summary/Follow up Plan: Pt seen for bedside swallow eval. Wet voice, cough and throat clear with thin and mixed consistency.  Pt had ativan for earlier MRI so still feeling lethargic. Rec reg diet, no mixed consistencies, nectar liquids, meds with puree. ST to re-eval 1/5.          IP SLP Goals       01/04/18 1126          Safely Consume Diet    Safely Consume Diet- SLP, Time to Achieve by discharge  -JT      Safely Consume Diet- SLP, Outcome goal ongoing  -JT        User Key  (r) = Recorded By, (t) = Taken By, (c) = Cosigned By    Initials Name Provider Type    JOSE Zapata Speech and Language Pathologist             SLP Outcome Measures (last 72 hours)      SLP Outcome Measures       01/04/18 1300          SLP Outcome Measures    Outcome Measure Used? Adult NOMS  -JT      FCM Scores    FCM Chosen Swallowing  -JT      Swallowing FCM Score 5  -JT        User Key  (r) = Recorded By, (t) = Taken By, (c) = Cosigned By    Initials Name Effective Dates    JOSE Zapata 12/11/15 -            Time Calculation:          Time Calculation- SLP       01/04/18 1305          Time Calculation- SLP    SLP Start Time 1100  -JT      SLP Stop Time 1145  -JT      SLP Time Calculation (min) 45 min  -JT      SLP Received On 01/04/18  -JT        User Key  (r) = Recorded By, (t) = Taken By, (c) = Cosigned By    Initials Name Provider Type    JT Anupama Zapata Speech and Language Pathologist          Therapy Charges for Today     Code Description Service Date Service Provider Modifiers Qty    72717622596  ST EVAL ORAL PHARYNG SWALLOW 3 1/4/2018 Anupama Zapata GN 1               Anupama Zapata  1/4/2018     Electronically signed by Anupama Zapata at 1/4/2018  1:10 PM

## 2018-01-04 NOTE — THERAPY DISCHARGE NOTE
Acute Care - Physical Therapy Initial Eval/Discharge  [facility]     Patient Name: Roberta Houston  : 1950  MRN: [id number]  Today's Date: 2018   Onset of Illness/Injury or Date of Surgery Date: 18  Date of Referral to PT: 18  Referring Physician: Veronika Moon      Admit Date: 1/3/2018    Visit Dx:    ICD-10-CM ICD-9-CM   1. Acute CVA (cerebrovascular accident) I63.9 434.91   2. Essential hypertension I10 401.9     Patient Active Problem List   Diagnosis   • Acute CVA (cerebrovascular accident)   • HTN (hypertension)   • Dyslipidemia   • Intracranial atherosclerosis     Past Medical History:   Diagnosis Date   • Hypertension    • TIA (transient ischemic attack)      Past Surgical History:   Procedure Laterality Date   • TONSILLECTOMY     • TUBAL ABDOMINAL LIGATION     • WISDOM TOOTH EXTRACTION            PT ASSESSMENT (last 72 hours)      PT Evaluation       18 1538 18 1248    Rehab Evaluation    Document Type evaluation  -PC     Subjective Information agree to therapy  -PC     Evaluation Not Performed  --   pt off floor to cardio. Will follow up next service date  -LE    Patient Effort, Rehab Treatment good  -PC     General Information    Patient Profile Review yes  -PC     Onset of Illness/Injury or Date of Surgery Date 18  -PC     Referring Physician Veronika Moon  -PC     General Observations pt is a pleasant white female in bed, no acute dsitress  -PC     Pertinent History Of Current Problem adm with R sided weakness, dysphasia, fond to have small new L side infarct as well as several old small infarcts  -PC     Prior Level of Function independent:;all household mobility;community mobility;work  -PC     Clinical Impression    Date of Referral to PT 18  -PC     Criteria for Skilled Therapeutic Interventions Met no problems identified which require skilled intervention  -PC     Pain Assessment    Pain Assessment No/denies pain  -PC     Cognitive  Assessment/Intervention    Current Cognitive/Communication Assessment functional  -PC     Orientation Status oriented x 4  -PC     Follows Commands/Answers Questions 100% of the time  -PC     Personal Safety WNL/WFL  -PC     Personal Safety Interventions fall prevention program maintained;gait belt  -PC     ROM (Range of Motion)    General ROM no range of motion deficits identified  -PC     MMT (Manual Muscle Testing)    General MMT Assessment no strength deficits identified  -PC     Bed Mobility, Assessment/Treatment    Bed Mob, Supine to Sit, [place] independent  -PC     Bed Mob, Sit to Supine, [place] independent  -PC     Transfer Assessment/Treatment    Transfers, Sit-Stand [place] independent  -PC     Transfers, Stand-Sit [place] independent  -PC     Gait Assessment/Treatment    Gait, [place] Level independent  -PC     Gait, Distance (Feet) 400  -PC     Gait, Comment normal gait pattern with good step length, no loss of balance  -PC     Positioning and Restraints    Pre-Treatment Position in bed  -PC     Post Treatment Position bed  -PC     In Bed supine;call light within reach;encouraged to call for assist;exit alarm on  -PC       01/04/18 1130 01/04/18 1117    Rehab Evaluation    Document Type evaluation  -JT     Subjective Information agree to therapy;complains of;fatigue  -JT     Evaluation Not Performed  --   with SLP.  RN aware  -LE    Patient Effort, Rehab Treatment adequate  -JT     Symptoms Noted During/After Treatment fatigue   pt had been given ativan prior to earlier MRI  -JT     Pain Assessment    Pain Assessment No/denies pain  -JT     Cognitive Assessment/Intervention    Current Cognitive/Communication Assessment functional  -JT     Orientation Status oriented to;person;place  -JT     Follows Commands/Answers Questions 100% of the time;able to follow single-step instructions  -JT       01/04/18 0956 01/04/18 0500    Rehab Evaluation    Evaluation Not Performed patient  unavailable for evaluation   Pt NEEL for testing.SLP to try in PM.  -JT     General Information    Equipment Currently Used at Home  none  -AN    Living Environment    Transportation Available  car;family or friend will provide  -AN      01/03/18 7840       General Information    Equipment Currently Used at Home none  -AN     Living Environment    Lives With alone  -AN     Living Arrangements house  -AN     Home Accessibility no concerns  -AN     Stair Railings at Home none  -AN     Type of Financial/Environmental Concern none  -AN     Transportation Available car;family or friend will provide  -AN       User Key  (r) = Recorded By, (t) = Taken By, (c) = Cosigned By    Initials Name Provider Type    FLORIDALMA Au, OTR Occupational Therapist    PC Tabitha Price, PT Physical Therapist    CRISTIN Kohli, RN Registered Nurse    JOSE Zapata Speech and Language Pathologist              PT Recommendation and Plan  Anticipated Discharge Disposition: home  PT Frequency: evaluation only  Plan of Care Review  Outcome Summary/Follow up Plan: pt is doing well with strength and mobility, she is walking independently with no loss of balance, no further PT is indicated, will sign off              Outcome Measures       01/04/18 1500          How much help from another person do you currently need...    Turning from your back to your side while in flat bed without using bedrails? 4  -PC      Moving from lying on back to sitting on the side of a flat bed without bedrails? 4  -PC      Moving to and from a bed to a chair (including a wheelchair)? 4  -PC      Standing up from a chair using your arms (e.g., wheelchair, bedside chair)? 4  -PC      Climbing 3-5 steps with a railing? 4  -PC      To walk in hospital room? 4  -PC      AM-PAC 6 Clicks Score 24  -PC      Functional Assessment    Outcome Measure Options AM-PAC 6 Clicks Basic Mobility (PT)  -PC        User Key  (r) = Recorded By, (t) = Taken By, (c) =  Cosigned By    Initials Name Provider Type    PC Tabitha Price, PT Physical Therapist           Time Calculation:         PT Charges       01/04/18 1544          Time Calculation    Start Time 1524  -PC      Stop Time 1540  -PC      Time Calculation (min) 16 min  -PC      PT Received On 01/04/18  -PC        User Key  (r) = Recorded By, (t) = Taken By, (c) = Cosigned By    Initials Name Provider Type    PC Tabihta Price, PT Physical Therapist          Therapy Charges for Today     Code Description Service Date Service Provider Modifiers Qty    22494524756 HC PT EVAL LOW COMPLEXITY 1 1/4/2018 Tabitha Price, PT GP 1    28975592082 HC PT THER PROC EA 15 MIN 1/4/2018 Tabitha Price, PT GP 1          PT G-Codes  Outcome Measure Options: AM-PAC 6 Clicks Basic Mobility (PT)    PT Discharge Summary  Anticipated Discharge Disposition: home    Tabitha Price PT  1/4/2018

## 2018-01-04 NOTE — SIGNIFICANT NOTE
01/04/18 1117   Rehab Treatment   Discipline occupational therapist   Rehab Evaluation   Evaluation Not Performed (with SLP.  RN aware)   Recommendation   OT - Next Appointment 01/05/18

## 2018-01-04 NOTE — PLAN OF CARE
Problem: Patient Care Overview (Adult)  Goal: Plan of Care Review  Outcome: Ongoing (interventions implemented as appropriate)   01/04/18 1126   Coping/Psychosocial Response Interventions   Plan Of Care Reviewed With patient   Patient Care Overview   Progress progress towards functional goals is fair   Outcome Evaluation   Outcome Summary/Follow up Plan Pt seen for bedside swallow eval. Wet voice, cough and throat clear with thin and mixed consistency. Pt had ativan for earlier MRI so still feeling lethargic. Rec reg diet, no mixed consistencies, nectar liquids, meds with puree. ST to re-eval 1/5.       Problem: Inpatient SLP  Goal: Dysphagia- Patient will safely consume diet as per recommendation with no signs/symptoms of aspiration  Outcome: Ongoing (interventions implemented as appropriate)   01/04/18 1126   Safely Consume Diet   Safely Consume Diet- SLP, Time to Achieve by discharge   Safely Consume Diet- SLP, Outcome goal ongoing

## 2018-01-04 NOTE — H&P
Internal medicine history and physical    INTERNAL MEDICINE   [facility]       Patient Identification:  Name: Roberta Houston  Age: 67 y.o.  Sex: female  :  1950  MRN: [id number]                   Primary Care Physician: JENNIFER Cintron                                   Chief Complaint:  Right-sided facial droop since 1 PM yesterday, right-sided weakness for the last 3 weeks.    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  She has blood pressure since her 20s but has not been any medications for the last 5 years for reason unclear she does so that she couldn't tolerate Zocor or Lipitor because of severe back pain.  In that background she presented to her primary care physician on 2017 with one-week history of sudden weakness of the right leg and right arm.  At that time her blood pressure was recorded as 220/150 with heart rate of 61.  BMI was recorded as 33.98. By the time she saw the physician on 2017 the weakness has been steadily improving.  During that visit patient was started on diltiazem and losartan/hydrochlorothiazide and CT scan of the head stroke protocol was ordered.  It was finally done on 2017 and it revealed: Multiple lacunar infarcts involving the right middle of the cerebral hemisphere bilaterally.  No obvious acute infarct noted.  Area in the left parietal lobe was more prominent.  10 days after her initial visit with her primary care physician on 2017 patient had a follow-up with her primary care physician on 2017 was found to have blood pressure improved to 220/104.  Clonidine was added to her regimen at a dose of 0.2 mg twice daily.  Patient was given a follow-up appointment on 2017.  Patient has done well since the  and was able to go to work and worked 3 shifts in a row.  She works  night shift as a respiratory therapist and was able to carry out her tasks without any significant impairment.  She had 2 days off after the 3 shifts in a row, and felt very exhausted on Monday and Tuesday and slept most of the time.  Yesterday she woke up and was getting ready to quit to work in the afternoon and did notice that her face was not feeling right.  She is not sure when was the first time she noticed that about 1:00 it was very prominent.  She called her daughter about this facial droop and came to the emergency room for further evaluation.  She denies any worsening in her right arm and leg weakness and clearly say is that it is better than what it was 3 weeks ago.  She denies any chest pain shortness of breath nausea vomiting or diarrhea.  She did have a short period of time a sensation that she couldn't understand and express herself very well but she doesn't have any problem at this time.      Past Medical History:  Past Medical History:   Diagnosis Date   • Hypertension    • TIA (transient ischemic attack)      Past Surgical History:  Past Surgical History:   Procedure Laterality Date   • TONSILLECTOMY     • TUBAL ABDOMINAL LIGATION     • WISDOM TOOTH EXTRACTION        Home Meds:  Prescriptions Prior to Admission   Medication Sig Dispense Refill Last Dose   • CloNIDine (CATAPRES) 0.2 MG tablet Take 0.2 mg by mouth 2 (Two) Times a Day.      • diltiaZEM CD (CARDIZEM CD) 240 MG 24 hr capsule Take 240 mg by mouth Daily.      • losartan-hydrochlorothiazide (HYZAAR) 100-25 MG per tablet Take 1 tablet by mouth Daily.        Current Meds:     Current Facility-Administered Medications:   •  aluminum-magnesium hydroxide-simethicone (MAALOX MAX) 400-400-40 MG/5ML suspension 7.5 mL, 7.5 mL, Oral, Q4H PRN, Mark Graham MD  •  aspirin tablet 325 mg, 325 mg, Oral, Daily, 325 mg at 01/04/18 0818 **OR** aspirin suppository 300 mg, 300 mg, Rectal, Daily, Mark Graham MD  •  atorvastatin (LIPITOR) tablet  80 mg, 80 mg, Oral, Nightly, Mark Graham MD, 80 mg at 01/04/18 0214  •  bisacodyl (DULCOLAX) suppository 10 mg, 10 mg, Rectal, Daily PRN, Mark Graham MD  •  CloNIDine (CATAPRES) tablet 0.2 mg, 0.2 mg, Oral, Q12H, Mark Graham MD, 0.2 mg at 01/04/18 0818  •  docusate sodium (COLACE) capsule 100 mg, 100 mg, Oral, BID PRN, Mark Graham MD  •  labetalol (NORMODYNE,TRANDATE) injection 20 mg, 20 mg, Intravenous, Q6H PRN, Mark Graham MD  •  ondansetron (ZOFRAN) injection 4 mg, 4 mg, Intravenous, Q6H PRN, Mark Graham MD  •  potassium chloride (MICRO-K) CR capsule 40 mEq, 40 mEq, Oral, PRN **OR** potassium chloride (KLOR-CON) packet 40 mEq, 40 mEq, Oral, PRN **OR** potassium chloride 10 mEq in 100 mL IVPB, 10 mEq, Intravenous, Q1H PRN, Mark Graham MD  •  sodium chloride 0.9 % flush 1-10 mL, 1-10 mL, Intravenous, PRN, Mark Graham MD  •  sodium chloride 0.9 % flush 10 mL, 10 mL, Intravenous, PRN, Marco Antonio Armas MD  Allergies:  Allergies   Allergen Reactions   • Budesonide    • Neomycin-Bacitracin Zn-Polymyx Rash   • Vitamin E Rash     Social History:   Social History   Substance Use Topics   • Smoking status: Never Smoker   • Smokeless tobacco: Not on file   • Alcohol use No      Family History:  History reviewed. No pertinent family history.       Review of Systems  See history of present illness and past medical history.   Constitutional: Negative for fever.   HENT: Negative for sore throat.    Eyes: Negative.    Respiratory: Negative for cough and shortness of breath.    Cardiovascular: Negative for chest pain.   Gastrointestinal: Negative for abdominal pain, diarrhea and vomiting.   Genitourinary: Negative for dysuria.   Musculoskeletal: Negative for neck pain.   Skin: Negative for rash.   Allergic/Immunologic: Negative.    Neurological: Positive for speech difficulty and weakness (right sided). Negative for numbness and headaches.   Hematological: Negative.   "  Psychiatric/Behavioral: Positive for confusion - now resolved    Vitals:   BP (!) 197/91  Pulse 53  Temp 98.2 °F (36.8 °C) (Oral)   Resp 17  Ht 152.4 cm (60\")  Wt 77.1 kg (170 lb)  SpO2 94%  BMI 33.2 kg/m2  I/O: No intake or output data in the 24 hours ending 01/04/18 0841  Exam:  General Appearance:    Alert, cooperative, no distress, appears stated age   Head:    Normocephalic, without obvious abnormality, atraumatic   Eyes:    PERRL, conjunctiva/corneas clear, EOM's intact, both eyes   Ears:    Normal external ear canals, both ears   Nose:   Nares normal, septum midline, mucosa normal, no drainage    or sinus tenderness   Throat:   Lips, tongue, gums normal; oral mucosa pink and moist   Neck:   Supple, symmetrical, trachea midline, no adenopathy;     thyroid:  no enlargement/tenderness/nodules; no carotid    bruit or JVD   Back:     Symmetric, no curvature, ROM normal, no CVA tenderness   Lungs:     Clear to auscultation bilaterally, respirations unlabored   Chest Wall:    No tenderness or deformity    Heart:    Regular rate and rhythm, S1 and S2 normal, no murmur, rub   or gallop   Abdomen:     Soft, non-tender, bowel sounds active all four quadrants,     no masses, no hepatomegaly, no splenomegaly   Extremities:   Extremities normal, atraumatic, no cyanosis or edema   Pulses:   Pulses palpable in all extremities; symmetric all extremities   Skin:   Skin color normal, Skin is warm and dry,  no rashes or palpable lesions   Neurologic:   Is flattening of the right nasolabial fold noted.  Her speech is slightly nasal and thick and she clearly says that her speech is definitely different than what she is accustomed to.  She has 4/5 strength in upper extremities on the right side.  She does not have significant weakness on the right leg.  Left side is essentially unremarkable.       Data Review:      I reviewed the patient's new clinical results.    Results from last 7 days  Lab Units 01/04/18  6167 " 01/03/18  1845   WBC 10*3/mm3 9.78 11.76*   HEMOGLOBIN g/dL 14.7 15.2   PLATELETS 10*3/mm3 281 298       Results from last 7 days  Lab Units 01/04/18  0442 01/03/18  1845   SODIUM mmol/L 141 144   POTASSIUM mmol/L 3.5 3.1*   CHLORIDE mmol/L 101 103   CO2 mmol/L 30.2* 28.6   BUN mg/dL 8 8   CREATININE mg/dL 0.65 0.73   CALCIUM mg/dL 9.0 9.4   GLUCOSE mg/dL 97 110*       Assessment:  Active Hospital Problems (** Indicates Principal Problem)    Diagnosis Date Noted   • **Acute CVA (cerebrovascular accident) [I63.9] 01/03/2018   • HTN (hypertension) [I10] 01/04/2018   • Dyslipidemia [E78.5] 01/04/2018      Resolved Hospital Problems    Diagnosis Date Noted Date Resolved   No resolved problems to display.   Imaging studies and ECG reviewed    Plan:  Acute/subacute ischemic CVA with multiple vascular territory involvement as well as lacunar process - Plan:    -continue with blood pressure control as per stroke guidelines and the situation,    -aspirin and follow-up on neurology recommendation for further anticoagulation needs.   -Patient is intolerant to statins and has tried Lipitor and Zocor in the past with debilitating back pain and does not want to try statin anymore.  We'll consider niacin and fibric acid derivatives PCK9 inhibitors to address her obviously abnormal lipid profile in the context of acute CVA.     -Continue with physical therapy and follow up on echocardiogram results.  Hypertension poorly controlled - continue present combination of clonidine and Hyzaar and gradually bring the blood pressure down to acceptable level.  Dyslipidemia poorly controlled with statin intolerance due to back pain.  Discussed with neurology service would consider cardiology or endocrinology evaluation to manage this clinically significant and complicating hyperlipidemia in the context of statin intolerance.  Vinicio Ruiz MD   1/4/2018  8:41 AM  Much of this encounter note is an electronic transcription/translation of spoken  language to printed text. The electronic translation of spoken language may permit erroneous, or at times, nonsensical words or phrases to be inadvertently transcribed; Although I have reviewed the note for such errors, some may still exist

## 2018-01-04 NOTE — SIGNIFICANT NOTE
01/04/18 1248   Rehab Treatment   Discipline occupational therapist   Rehab Evaluation   Evaluation Not Performed (pt off floor to cardio. Will follow up next service date)   Recommendation   OT - Next Appointment 01/05/18

## 2018-01-04 NOTE — PROGRESS NOTES
Discharge Planning Assessment  [facility]     Patient Name: Roberta Houston  MRN: [id number]  Today's Date: 1/4/2018    Admit Date: 1/3/2018          Discharge Needs Assessment       01/04/18 1700    Living Environment    Lives With alone    Living Arrangements house    Transportation Available car;family or friend will provide    Discharge Needs Assessment    Concerns To Be Addressed no discharge needs identified    Equipment Currently Used at Home none    Equipment Needed After Discharge none    Discharge Disposition home or self-care    Discharge Planning Comments PT note reviewed:  pt is doing well with strength and mobility, walking independently with no loss of balance and signed off.  No skilled PT needs.            Discharge Plan       01/04/18 1701    Case Management/Social Work Plan    Plan Home    Additional Comments CCP is available to assist if needed.  Awaiting eval ST and OT.          Discharge Placement     No information found        Expected Discharge Date and Time     Expected Discharge Date Expected Discharge Time    Jan 5, 2018               Demographic Summary       01/04/18 1659    Referral Information    Admission Type inpatient    Arrived From admitted as an inpatient;still a patient    Referral Source hospital clinician/department (specify)   CVA    Reason For Consult care coordination/care conference;discharge planning            Functional Status       01/04/18 1700    Functional Status Current    Ambulation 2-->assistive person    Transferring 2-->assistive person    Toileting 2-->assistive person    Bathing 0-->independent    Dressing 0-->independent    Eating 0-->independent    Functional Status Prior    Ambulation 0-->independent    Transferring 0-->independent    Toileting 0-->independent    Bathing 0-->independent    Dressing 0-->independent    Eating 0-->independent    Communication 0-->understands/communicates without difficulty    Swallowing 0-->swallows foods/liquids without  difficulty    Activity Tolerance    Current Activity Limitations none    Usual Activity Tolerance excellent    Current Activity Tolerance excellent    Cognitive/Perceptual/Developmental    Current Mental Status/Cognitive Functioning unable to assess            Psychosocial     None            Abuse/Neglect     None            Legal     None            Substance Abuse     None            Patient Forms     None          Deandre Zaldivar RN

## 2018-01-04 NOTE — PLAN OF CARE
Problem: Patient Care Overview (Adult)  Goal: Plan of Care Review   01/04/18 1545   Outcome Evaluation   Outcome Summary/Follow up Plan pt is doing well with strength and mobility, she is walking independently with no loss of balance, no further PT is indicated, will sign off

## 2018-01-04 NOTE — THERAPY EVALUATION
Acute Care - Speech Language Pathology   Swallow Initial Evaluation [facility]     Patient Name: Roberta Houston  : 1950  MRN: [id number]  Today's Date: 2018               Admit Date: 1/3/2018    SPEECH-LANGUAGE PATHOLOGY EVALUATION - SWALLOW  Subjective: The patient was seen on this date for a Clinical Swallow evaluation.  Patient was somnolent but cooperative. Pt had recently been given Ativan before MRI, so somewhat fatigued.  Significant history: Adm w/acute CVA with right facial droop and confusion.   Objective: Textures given included thin liquid, nectar thick liquid, puree consistency, mechanical soft consistency and regular consistency.  Assessment: Difficulties were noted with thin liquid and mechanical soft consistency.  Observations: Pt w/mild oral residue with regular consistency which cleared with liquid wash. Wet voice, cough, and throat clear noted with thin liquids (small sips) as well as mixed consistency. Pt tolerated nectar thick liquids by straw w/ no overt s/s aspiration. Laryngeal elevation was appropriate and swallow was timely.   oral residue, throat clear, cough and wet vocal quality  SLP Findings:  Patient presents with mild to moderate oropharyngeal dysphagia, without esophageal component.   Recommendations: Diet Textures: nectar thick liquid, regular consistency with no mixed textures food.  Medications should be taken whole or crushed with puree. May have water and ice between meals after oral care, under staff or family supervision and with the recommended strategies for safe swallowing.   Recommended Strategies: Upright for PO, small bites and sips, may use straw and alternate liquids and solids. Oral care before breakfast, after all meals and PRN.  Other Recommended Evaluations: Re-evaluation at bedside    Dysphagia therapy is recommended. Rationale: monitor diet tolerance.        Visit Dx:     ICD-10-CM ICD-9-CM   1. Acute CVA (cerebrovascular accident) I63.9 434.91    2. Essential hypertension I10 401.9     Patient Active Problem List   Diagnosis   • Acute CVA (cerebrovascular accident)   • HTN (hypertension)   • Dyslipidemia     Past Medical History:   Diagnosis Date   • Hypertension    • TIA (transient ischemic attack)      Past Surgical History:   Procedure Laterality Date   • TONSILLECTOMY     • TUBAL ABDOMINAL LIGATION     • WISDOM TOOTH EXTRACTION            SWALLOW EVALUATION (last 72 hours)      Swallow Evaluation       01/04/18 1130                Rehab Evaluation    Document Type evaluation  -JT        Subjective Information agree to therapy;complains of;fatigue  -JT        Patient Effort, Rehab Treatment adequate  -JT        Symptoms Noted During/After Treatment fatigue   pt had been given ativan prior to earlier MRI  -JT        General Information    Patient Profile Review yes  -JT        Subjective Patient Observations alert but fatigued  -JT        Pertinent History Of Current Problem Acute CVA w/Right facial droop and confusion  -JT        Current Diet Limitations regular solid;thin liquids  -JT        Precautions/Limitations, Hearing WNL  -JT        Prior Level of Function- Communication functional in all spheres  -JT        Prior Level of Function- Swallowing no diet consistency restrictions  -JT        Plans/Goals Discussed With patient;agreed upon  -JT        Barriers to Rehab none identified  -JT        Clinical Impression    Patient's Goals For Discharge patient did not state  -JT        SLP Swallowing Diagnosis mild dysphagia;oral dysfunction;pharyngeal dysfunction;moderate dysphagia  -JT        Rehab Potential/Prognosis, Swallowing good, to achieve stated therapy goals  -JT        Criteria for Skilled Therapeutic Interventions Met demonstrates skilled criteria for intervention  -JT        Therapy Frequency evaluation only  -JT        Predicted Duration Therapy Interv (days) until discharge  -JT        SLP Diet Recommendation regular  textures;nectar/syrup-thick liquids   NO MIXED CONSISTENCIES  -JT        Recommended Diagnostics reassess via clinical swallow (non-instrumental exam)  -JT        Recommended Feeding/Eating Techniques no straws;small sips/bites  -JT        SLP Rec. for Method of Medication Administration meds whole in pudding/applesauce;meds crushed in pudding/applesauce  -JT        Monitor For Signs Of Aspiration cough;elevated WBC count;gurgly voice;throat clearing;fever;upper respiratory infection;pneumonia;right lower lobe infiltrates  -JT        Anticipated Discharge Disposition other (see comments)   unknown  -JT        Pain Assessment    Pain Assessment No/denies pain  -JT        Cognitive Assessment/Intervention    Current Cognitive/Communication Assessment functional  -JT        Orientation Status oriented to;person;place  -JT        Follows Commands/Answers Questions 100% of the time;able to follow single-step instructions  -JT        Oral Motor Structure and Function    Oral Motor Anatomy and Physiology patient demonstrates anatomy that is WNL  -JT        Dentition Assessment present and adequate  -JT        Secretion Management WNL/WFL  -JT        Mucosal Quality moist, healthy  -JT        Velar Elevation WNL (within normal limits)  -JT        Volitional Swallow no difficulties initiating volitional swallow  -JT        Volitional Cough weak volitional cough  -JT        Oral Musculature General Assessment oral labial impairment  -JT        Oral Labial Strength and Mobility right labial droop  -JT        Clinical Swallow Exam    Mode of Presentation fed by clinician;cup;spoon;straw  -JT        Oral Residue cohesive solid:;tongue residue   mild  -JT        Oral Phase Results intact oral phase without signs of dysfunction  -JT        Pharyngeal Phase Results cough;throat clear;sign/symptoms of pharyngeal impairment   wet vocal quality w/thin, mixed   -JT        Summary of Clinical Exam Pt w/ mild-mod oropharyngeal dysphagia  characterized by general weakness, wet voice, cough and throat clear with thin and mixed consistency, and mild oral residue with regular solids.   -JT          User Key  (r) = Recorded By, (t) = Taken By, (c) = Cosigned By    Initials Name Effective Dates    JT Anupama Jacosblen 12/11/15 -         EDUCATION  The patient has been educated in the following areas:   Dysphagia (Swallowing Impairment).    SLP Recommendation and Plan  SLP Swallowing Diagnosis: mild dysphagia, oral dysfunction, pharyngeal dysfunction, moderate dysphagia  SLP Diet Recommendation: regular textures, nectar/syrup-thick liquids (NO MIXED CONSISTENCIES)  Recommended Feeding/Eating Techniques: no straws, small sips/bites  SLP Rec. for Method of Medication Administration: meds whole in pudding/applesauce, meds crushed in pudding/applesauce  Monitor For Signs Of Aspiration: cough, elevated WBC count, gurgly voice, throat clearing, fever, upper respiratory infection, pneumonia, right lower lobe infiltrates  Recommended Diagnostics: reassess via clinical swallow (non-instrumental exam)  Criteria for Skilled Therapeutic Interventions Met: demonstrates skilled criteria for intervention  Anticipated Discharge Disposition: other (see comments) (unknown)  Rehab Potential/Prognosis, Swallowing: good, to achieve stated therapy goals  Therapy Frequency: evaluation only             Plan of Care Review  Plan Of Care Reviewed With: patient  Progress: progress towards functional goals is fair  Outcome Summary/Follow up Plan: Pt seen for bedside swallow eval. Wet voice, cough and throat clear with thin and mixed consistency.  Pt had ativan for earlier MRI so still feeling lethargic. Rec reg diet, no mixed consistencies, nectar liquids, meds with puree. ST to re-eval 1/5.          IP SLP Goals       01/04/18 1126          Safely Consume Diet    Safely Consume Diet- SLP, Time to Achieve by discharge  -JT      Safely Consume Diet- SLP, Outcome goal ongoing  -JT         User Key  (r) = Recorded By, (t) = Taken By, (c) = Cosigned By    Initials Name Provider Type    JOSE Zapata Speech and Language Pathologist             SLP Outcome Measures (last 72 hours)      SLP Outcome Measures       01/04/18 1300          SLP Outcome Measures    Outcome Measure Used? Adult NOMS  -JT      FCM Scores    FCM Chosen Swallowing  -JT      Swallowing FCM Score 5  -JT        User Key  (r) = Recorded By, (t) = Taken By, (c) = Cosigned By    Initials Name Effective Dates    JT Anupama Jacobslen 12/11/15 -            Time Calculation:         Time Calculation- SLP       01/04/18 1305          Time Calculation- SLP    SLP Start Time 1100  -JT      SLP Stop Time 1145  -JT      SLP Time Calculation (min) 45 min  -JT      SLP Received On 01/04/18  -JT        User Key  (r) = Recorded By, (t) = Taken By, (c) = Cosigned By    Initials Name Provider Type    JOSE Zapata Speech and Language Pathologist          Therapy Charges for Today     Code Description Service Date Service Provider Modifiers Qty    [id number] HC ST EVAL ORAL PHARYNG SWALLOW 3 1/4/2018 Anupama Zapata GN 1               Anupama Zapata  1/4/2018

## 2018-01-04 NOTE — PLAN OF CARE
Problem: Patient Care Overview (Adult)  Goal: Plan of Care Review  Outcome: Ongoing (interventions implemented as appropriate)   01/04/18 1126 01/04/18 1748   Patient Care Overview   Progress progress towards functional goals is fair --    Outcome Evaluation   Outcome Summary/Follow up Plan --  NIH was a 1 for mild aphagia. MRI and Echo completed. Speech Therapy change diet temporarily to reg, soft with necter thick liquids and meds with puree. Speech Therapy will reevaluate in AM. PT signed off. Pt is able to walk at renea. No c/o of pain. BP elevated, meds given, will continue to monitor.     Goal: Adult Individualization and Mutuality  Outcome: Ongoing (interventions implemented as appropriate)    Goal: Discharge Needs Assessment  Outcome: Ongoing (interventions implemented as appropriate)      Problem: Stroke (Ischemic) (Adult)  Goal: Signs and Symptoms of Listed Potential Problems Will be Absent or Manageable (Stroke)  Outcome: Ongoing (interventions implemented as appropriate)

## 2018-01-05 ENCOUNTER — APPOINTMENT (OUTPATIENT)
Dept: CARDIOLOGY | Facility: HOSPITAL | Age: 68
End: 2018-01-05
Attending: INTERNAL MEDICINE

## 2018-01-05 LAB
PA ADP PRP-ACNC: 189 PRU (ref 194–418)
RPR SER QL: NORMAL
VIT B12 BLD-MCNC: 450 PG/ML (ref 211–946)

## 2018-01-05 PROCEDURE — 86592 SYPHILIS TEST NON-TREP QUAL: CPT | Performed by: RADIOLOGY

## 2018-01-05 PROCEDURE — 82607 VITAMIN B-12: CPT | Performed by: NURSE PRACTITIONER

## 2018-01-05 PROCEDURE — 85576 BLOOD PLATELET AGGREGATION: CPT | Performed by: NURSE PRACTITIONER

## 2018-01-05 PROCEDURE — 83835 ASSAY OF METANEPHRINES: CPT | Performed by: INTERNAL MEDICINE

## 2018-01-05 PROCEDURE — 82570 ASSAY OF URINE CREATININE: CPT | Performed by: INTERNAL MEDICINE

## 2018-01-05 PROCEDURE — 99233 SBSQ HOSP IP/OBS HIGH 50: CPT | Performed by: NURSE PRACTITIONER

## 2018-01-05 PROCEDURE — 93888 INTRACRANIAL LIMITED STUDY: CPT

## 2018-01-05 PROCEDURE — 84244 ASSAY OF RENIN: CPT | Performed by: INTERNAL MEDICINE

## 2018-01-05 PROCEDURE — 99231 SBSQ HOSP IP/OBS SF/LOW 25: CPT | Performed by: INTERNAL MEDICINE

## 2018-01-05 PROCEDURE — 97112 NEUROMUSCULAR REEDUCATION: CPT

## 2018-01-05 PROCEDURE — 97165 OT EVAL LOW COMPLEX 30 MIN: CPT

## 2018-01-05 PROCEDURE — 92526 ORAL FUNCTION THERAPY: CPT

## 2018-01-05 RX ORDER — ROSUVASTATIN CALCIUM 10 MG/1
10 TABLET, COATED ORAL NIGHTLY
Status: DISCONTINUED | OUTPATIENT
Start: 2018-01-05 | End: 2018-01-07

## 2018-01-05 RX ORDER — CLONIDINE HYDROCHLORIDE 0.1 MG/1
0.2 TABLET ORAL EVERY 8 HOURS SCHEDULED
Status: DISCONTINUED | OUTPATIENT
Start: 2018-01-05 | End: 2018-01-08 | Stop reason: HOSPADM

## 2018-01-05 RX ORDER — SPIRONOLACTONE 100 MG/1
100 TABLET, FILM COATED ORAL DAILY
Status: DISCONTINUED | OUTPATIENT
Start: 2018-01-05 | End: 2018-01-08 | Stop reason: HOSPADM

## 2018-01-05 RX ORDER — SPIRONOLACTONE 50 MG/1
50 TABLET, FILM COATED ORAL DAILY
Status: DISCONTINUED | OUTPATIENT
Start: 2018-01-05 | End: 2018-01-05

## 2018-01-05 RX ADMIN — SPIRONOLACTONE 100 MG: 100 TABLET, FILM COATED ORAL at 17:20

## 2018-01-05 RX ADMIN — ROSUVASTATIN CALCIUM 10 MG: 10 TABLET, FILM COATED ORAL at 21:47

## 2018-01-05 RX ADMIN — CLOPIDOGREL 75 MG: 75 TABLET, FILM COATED ORAL at 08:33

## 2018-01-05 RX ADMIN — CLONIDINE HYDROCHLORIDE 0.2 MG: 0.1 TABLET ORAL at 08:33

## 2018-01-05 RX ADMIN — CLONIDINE HYDROCHLORIDE 0.2 MG: 0.1 TABLET ORAL at 21:47

## 2018-01-05 RX ADMIN — ASPIRIN 325 MG: 325 TABLET ORAL at 08:33

## 2018-01-05 RX ADMIN — LOSARTAN POTASSIUM 100 MG: 100 TABLET ORAL at 08:33

## 2018-01-05 RX ADMIN — HYDROCHLOROTHIAZIDE 25 MG: 25 TABLET ORAL at 08:33

## 2018-01-05 NOTE — NURSING NOTE
Stroke booklet provided to patient. Discussed pages 12 and 13 specifically emphasizing all personal modifiable risk factors for stroke. Also discussed stroke medications, signs and symptoms of stroke and importance of calling 911/seeking immediate medical attention for any signs/symptoms of stroke. Patient expressed concern regarding desire and need to return to work, encouraged her to discuss with the neurology and hospitalist teams.  All questions answered.

## 2018-01-05 NOTE — PROGRESS NOTES
"DOS: 2018  NAME: Roberta Houston   : 1950  PCP: JENNIFER Cintron  Chief Complaint   Patient presents with   • Neuro Deficit(s)     RIGHT SIDED FACIAL DROOP, RIGHT SIDED ARM AND LEG WEAKNESS, went to bed around midnight then woke around 1pm with symptoms   • Hypertension     reports bp is normally as high as now     Stroke    Subjective: No new events. Patient reports mild generalized crown tingling.      Patient denies HA, double/blurred vision, dizziness, numbness or loss of sensation or any other new neurological complaints at this time.       Objective:  Wt Readings from Last 3 Encounters:   18 77.1 kg (170 lb)     Temp Readings from Last 3 Encounters:   18 98 °F (36.7 °C) (Oral)     BP Readings from Last 3 Encounters:   18 (!) 213/113     Pulse Readings from Last 3 Encounters:   18 65       Vital signs: BP (!) 213/113 (BP Location: Left arm, Patient Position: Sitting) Comment: pt c/o tingling in scalp. doctor aware. monitor for symptoms  Pulse 65  Temp 98 °F (36.7 °C) (Oral)   Resp 16  Ht 152.4 cm (60\")  Wt 77.1 kg (170 lb)  SpO2 93%  BMI 33.2 kg/m2      HEENT: Normocephalic, atraumatic   COR: RRR  Resp: Even and unlabored  Extremities: Equal pulses, non distal embolization    Neurological:   MS: AO. Language normal. No neglect. Higher integrative function normal  CN: II-XII normal; mild aphasia  And facial droop)  Motor: 5/5, normal tone  Reflexes: 2+  Sensory: Intact  Coordination: Normal  NIHSS 2 (mild aphasia  And facial droop)     Laboratory results:  Lab Results   Component Value Date    GLUCOSE 97 2018    CALCIUM 9.0 2018     2018    K 3.5 2018    CO2 30.2 (H) 2018     2018    BUN 8 2018    CREATININE 0.65 2018    EGFRIFNONA 91 2018    BCR 12.3 2018    ANIONGAP 9.8 2018     Lab Results   Component Value Date    WBC 9.78 2018    HGB 14.7 2018    HCT 45.8 (H) 2018    MCV " 93.7 01/04/2018     01/04/2018        Lab Results   Component Value Date    LDLCALC 139 (H) 01/04/2018     Lab Results   Component Value Date    HGBA1C 5.60 01/04/2018     Lab Results   Component Value Date    TSH 2.270 01/04/2018     No results found for: [id number]   RPR non-reactive   C-reactive protein 0.73  Fibrinogen 39  Sed Rate 19  T4 1.14    01/04/2018 TTE     Review and interpretation of imaging:  CT brain 1/3/18: No acute stroke mass or hemorrhage, there are multiple areas of encephalomalacia in the left MCA NAFISA border zone with some areas involving the cortex, is an old stroke in the left head of the caudate, old strokes in the right centrum semiovale ovale, is calcification of the intracranial vertebral arteries  CT Brain 12/22/17: Similar to the more recent scan but the left frontal white matter lesion appears to be new  MRI brain 1/4/18: Multiple acute strokes in the left MCA territory as well as a left MCA NAFISA border zone, flair shows moderate to mild periventricular white matter disease, SWI sequences are negative postcontrast images negative MRA of the aortic arch is poor quality due to patient motion, both internal carotid arteries are patent although severely tortuous with some areas of dilation particularly distally in the cervical ICAs right worst than left suggestive of fibromuscular dysplasia, the right vertebral artery is patent but the left is absent, MRA [place] shows a severe flow-limiting stenosis of the distal left middle cerebral artery, is also severe stenosis of the mid-basilar artery, there are diffuse irregularities of the anterior cerebral arteries distally    Impression: This is a 66 yo female with history of uncontrolled hypertension who presented to the ED with complaint of right sided weakness. MRI (+) for left MCA. TCDs-unable to see left MCA. TTE revealed EF 60.2% with normal LV function. LA mildly dilated. LA Volume Index 33.     Patient remains hypertensive  (213/113) although she is taking (3) oral medications; would recommend renal consult. Recommend family bring in CQ10 from home to assist with tolerating statin. Patient has tried Crestor, lipitor and Zocor w/o success. Goal for Crestor is 40 mg eventually; we will start with 10mg and will continue to titrate as she tolerates. Lower BP slowly. Progress activity slowly. Please monitor for any new or changing symptoms; may need intervention. CCP to assist with discharge planning. PT/OT/ST.     Plan:  Pharmacology consult to assist with COQ10 dosing   Homocystine pending    B-12 level and P2Y12   Aspirin 325mg  Plavix 75mg  Hydrate  Neurochecks  Ambulate slowly and monitor for worsening of symptoms  Crestor 10mg along with CoQ10 and titrate up to 40 mg as soon as possible  Non-pharmacological DVT prophylaxis  EKG Tele  PT/OT/ST  Stroke Education  Blood pressure control to <130/80  Goal LDL <70-recommend high dose statins-                       Serum glucose < 140  Once discharged Off work for 2 weeks; stay well hydrated and rest   Call 911 for stroke any stroke symptoms    All imaging and labs reviewed with Dr. Stark and he agrees with radiology impressions and plan. No significant lab abnormalities.

## 2018-01-05 NOTE — PROGRESS NOTES
[facility]     LOS: 2 days     Subjective:  No cp  No soa  Facial droop better  Right sided weakness better 80%  Eating some    Objective:    Vital Signs:  Temp:  [98 °F (36.7 °C)-98.7 °F (37.1 °C)] 98 °F (36.7 °C)  Heart Rate:  [53-68] 68  Resp:  [16-18] 16  BP: (182-221)/() 191/101    General Appearance: no acute distress, appears comfortable  Heart: regular rate and rhythm  Lungs: clear to auscultation bilaterally, respirations unlabored, good air entry  Abdomen: soft, non-tender, no guarding, no rebound, non-distended  Extremities: no edema, no cyanosis  Neurology: speech normal, CN grossly normal  Psychiatric: normal mood and affect    Results Review:    Glucose   Date Value Ref Range Status   01/04/2018 97 65 - 99 mg/dL Final   01/03/2018 110 (H) 65 - 99 mg/dL Final       Results from last 7 days  Lab Units 01/04/18  0442   WBC 10*3/mm3 9.78   HEMOGLOBIN g/dL 14.7   HEMATOCRIT % 45.8*   PLATELETS 10*3/mm3 281       Results from last 7 days  Lab Units 01/04/18  0442   SODIUM mmol/L 141   POTASSIUM mmol/L 3.5   CHLORIDE mmol/L 101   CO2 mmol/L 30.2*   BUN mg/dL 8   CREATININE mg/dL 0.65   CALCIUM mg/dL 9.0   BILIRUBIN mg/dL 0.6   ALK PHOS U/L 74   ALT (SGPT) U/L 24   AST (SGOT) U/L 10   GLUCOSE mg/dL 97       Results from last 7 days  Lab Units 01/04/18  0755   INR  1.08       Results from last 7 days  Lab Units 01/04/18  0442   MAGNESIUM mg/dL 2.5*         Cultures:       I have reviewed daily medications and changes in CPOE    Scheduled meds    aspirin 325 mg Oral Daily   Or      aspirin 300 mg Rectal Daily   CloNIDine 0.2 mg Oral Q12H   clopidogrel 75 mg Oral Daily   losartan 100 mg Oral Q24H   And      hydrochlorothiazide 25 mg Oral Daily   pravastatin 20 mg Oral Q PM         sodium chloride 125 mL/hr Last Rate: 125 mL/hr (01/04/18 2231)     PRN meds  •  aluminum-magnesium hydroxide-simethicone  •  bisacodyl  •  docusate sodium  •  labetalol  •  ondansetron  •  potassium  chloride **OR** potassium chloride **OR** potassium chloride  •  sodium chloride  •  sodium chloride      Principal Problem:    Acute CVA (cerebrovascular accident)  Active Problems:    HTN (hypertension)    Dyslipidemia    Intracranial atherosclerosis        Assessment/Plan:      Acute CVA (cerebrovascular accident)-apirin and plavix      HTN (hypertension)-losartan and HCTZ  -d/c fluids if ok with neurology    [facility], will await nephrology to see about further BP meds      Dyslipidemia      Intracranial atherosclerosis      Vincent Hansno MD  01/05/18  2:21 PM

## 2018-01-05 NOTE — PLAN OF CARE
Problem: Patient Care Overview (Adult)  Goal: Plan of Care Review  Outcome: Outcome(s) achieved Date Met: 01/05/18 01/05/18 1219   Coping/Psychosocial Response Interventions   Plan Of Care Reviewed With patient   Outcome Evaluation   Outcome Summary/Follow up Plan Pt presented to OT eval alert and oriented x4 and at baseline with mobility and ADLs. Pt with mild FMC and GMC deficits in R hand. Given and educated on HEP with red and blue foam block, able to demo and verbalize good understanding. No further OT services warranted. Will sign off, please f/u with any changes.       Problem: Inpatient Occupational Therapy  Goal: Patient Education Goal LTG- OT  Outcome: Outcome(s) achieved Date Met: 01/05/18 01/05/18 1219   Patient Education OT LTG   Patient Education OT LTG, Date Established 01/05/18   Patient Education OT LTG, Time to Achieve 1 wk   Patient Education OT LTG, Education Type HEP   Patient Education OT LTG, Additional Goal gross and fine motor coordination HEP

## 2018-01-05 NOTE — PROGRESS NOTES
Adult Nutrition  Assessment/PES    Patient Name:  Roberta Houston  YOB: 1950  MRN: [id number]  Admit Date:  1/3/2018    Assessment Date:  1/5/2018    Comments:  Completed nutrition assessment triggered by receives a dysphagia diet.          Reason for Assessment       01/05/18 1355    Reason for Assessment    Reason For Assessment/Visit identified at risk by screening criteria    Identified At Risk By Screening Criteria dysphagia or difficulty swallowing    Diagnosis Diagnosis    Cardiac Dyslipidemia;HTN    Neurological CVA                Anthropometrics       01/05/18 1356    Anthropometrics    RD Documented Weight on Admission 77.1 kg (169 lb 15.6 oz)    Body Mass Index (BMI)    BMI Grade 30 - 34.9- obesity - grade I            Labs/Tests/Procedures/Meds       01/05/18 1356    Labs/Tests/Procedures/Meds    Diagnostic Test/Procedure Review reviewed    Labs/Tests Review Reviewed    Procedure Review SLP    Swallow eval status Done    Type of SLP Evaluation Bedside    Medication Review Reviewed, pertinent;Other (comment)   ASA    Significant Vitals reviewed            Physical Findings       01/05/18 1356    Physical Findings/Assessment    Additional Documentation Physical Appearance (Group)    Physical Appearance    Skin other (see comments)   intact, Amos score 21            Estimated/Assessed Needs       01/05/18 1357    Calculation Measurements    Weight Used For Calculations 77.1 kg (169 lb 15.6 oz)    Height Used for Calculations 1.524 m (5')    Estimated/Assessed Energy Needs    Age 67    RMR (Cassia-St. Jeor Equation) 1237.5    Activity Factors (Cassia St Jeor)  Out of bed, ambulatory  1.3    Total estimated needs (Cassia St. Jeor) 1609    Estimated/Assessed Protein Needs    Weight Used for Protein Calculation 77.1 kg (169 lb 15.6 oz)    Protein (gm/kg) 1.0    1.0 Gm Protein (gm) 77.1    Estimated Protein Range 62-77    Estimated/Assessed Fluid Needs    Fluid Need Method RDA method    RDA  Method (mL)  1609            Nutrition Prescription Ordered       01/05/18 1358    Nutrition Prescription PO    Current PO Diet Regular    Fluid Consistency Nectar/syrup thick    Common Modifiers Cardiac;Low Fat    Other Modifiers Other (comment)   no mixed consistencies            Evaluation of Received Nutrient/Fluid Intake       01/05/18 1400    Calculation Measurements    Weight Used For Calculations 77.1 kg (169 lb 15.6 oz)    Evaluation of Received Nutrient/Fluid Intake    Number of Days Evaluated 1 day    Nutrition Delivered Fluid Evaluation    Fluid Intake Evaluation    IV Fluid (mL) 3000    Total Fluid Intake (mL) 3000    Total Fluid Intake (mL/kg) 38.91 mL/kg    % Fluid Needs 186    PO Evaluation    Number of Days PO Intake Evaluated Insufficient Data   no intake recorded            Problem/Interventions:        Problem 1       01/05/18 1402    Nutrition Diagnoses Problem 1    Problem 1 Nutrition Appropriate for Condition at this Time    Etiology (related to) Functional Diagnosis    Functional Diagnosis Dysphagia    Signs/Symptoms (evidenced by) SLP/Swallow eval    Swallow eval status Done    Type of SLP Evaluation Bedside                    Intervention Goal       01/05/18 1402    Intervention Goal    General Maintain nutrition    PO Tolerate PO;PO intake (%)    PO Intake % 75 %            Nutrition Intervention       01/05/18 1402    Nutrition Intervention    RD/Tech Action Follow Tx progress;Care plan reviewd              Education/Evaluation       01/05/18 1402    Education    Education Will Instruct as appropriate    Monitor/Evaluation    Monitor Per protocol        Electronically signed by:  Magda Larson RD  01/05/18 2:03 PM

## 2018-01-05 NOTE — PLAN OF CARE
Problem: Patient Care Overview (Adult)  Goal: Plan of Care Review  Outcome: Ongoing (interventions implemented as appropriate)   01/05/18 0877   Coping/Psychosocial Response Interventions   Plan Of Care Reviewed With patient   Patient Care Overview   Progress progress towards functional goals is fair   Outcome Evaluation   Outcome Summary/Follow up Plan pt c/o HA, b/p remains elevated. nephro added meds. renal dopplers tomorrow. NPO at md. up independently. will continue to monitor,       Problem: Stroke (Ischemic) (Adult)  Goal: Signs and Symptoms of Listed Potential Problems Will be Absent or Manageable (Stroke)  Outcome: Ongoing (interventions implemented as appropriate)         Fall Risk

## 2018-01-05 NOTE — PROGRESS NOTES
Pharmacy Consult    Pharmacy asked to assist pt with CoQ10 dosing for statin myalgia per Nubia Szymanski. Pt has tried several statins in the past and been unable to tolerate them due to muscle pain. Pt and Nubia Szymanski are aware that CoQ10 is non-formulary and the patient will bring in own supply. There is limited data regarding CoQ10 dosing for statin-induced myopathy. One study recommended 200mg per day. A meta-analysis done in January 2015 reported significant decrease in muscle pain in 2 out of 5 studies but a dose-response effect was not present. Doses in studies varied from 100-400mg per day. Advised pt to start with 200mg CoQ10 per day and if muscle pain develops she may try increasing the dose to 300-400mg per day.    Thank you for the consult,  Rigo ThapaD

## 2018-01-05 NOTE — THERAPY RE-EVALUATION
Acute Care - Speech Language Pathology   Swallow Re-Evaluation [facility]     Patient Name: Roberta Houston  : 1950  MRN: [id number]  Today's Date: 2018  Onset of Illness/Injury or Date of Surgery Date: 18            Admit Date: 1/3/2018    SPEECH-LANGUAGE PATHOLOGY EVALUATION - SWALLOW  Subjective: The patient was seen on this date for a Clinical Swallow evaluation.  Patient was alert and cooperative.  Significant history: acute CVA, put on [facility] soft w/ NTL yesterday.  Objective: Textures given included thin liquid, puree consistency, mechanical soft consistency and regular consistency.  Assessment: Difficulties were noted with none of the above consistencies. Pt tolerated thins with cup and straw. Significant improvement from yesterday.  SLP Findings:  Patient presents with minimal oropharyngeal dysphagia, without esophageal component.   Recommendations: Diet Textures: thin liquid, regular consistency food.  Medications should be taken whole with thin liquids.    Recommended Strategies: Upright for PO, small bites and sips and may use straw. Oral care before breakfast, after all meals and PRN.    Dysphagia therapy is recommended. Rationale: diet tolerance .      Visit Dx:     ICD-10-CM ICD-9-CM   1. Acute CVA (cerebrovascular accident) I63.9 434.91   2. Essential hypertension I10 401.9     Patient Active Problem List   Diagnosis   • Acute CVA (cerebrovascular accident)   • HTN (hypertension)   • Dyslipidemia   • Intracranial atherosclerosis     Past Medical History:   Diagnosis Date   • Hypertension    • TIA (transient ischemic attack)      Past Surgical History:   Procedure Laterality Date   • TONSILLECTOMY     • TUBAL ABDOMINAL LIGATION     • WISDOM TOOTH EXTRACTION            SWALLOW EVALUATION (last 72 hours)      Swallow Evaluation       18 1600 18 1130             Rehab Evaluation    Document Type re-evaluation  -AW evaluation  -JT       Subjective Information no  complaints;agree to therapy  -AW agree to therapy;complains of;fatigue  -JT       Patient Effort, Rehab Treatment good  -AW adequate  -JT       Symptoms Noted During/After Treatment none  -AW fatigue   pt had been given ativan prior to earlier MRI  -JT       General Information    Patient Profile Review  yes  -JT       Subjective Patient Observations  alert but fatigued  -JT       Pertinent History Of Current Problem  Acute CVA w/Right facial droop and confusion  -JT       Current Diet Limitations mechanical soft;nectar thick liquids  -AW regular solid;thin liquids  -JT       Precautions/Limitations, Hearing  WNL  -JT       Prior Level of Function- Communication functional in all spheres  -AW functional in all spheres  -JT       Prior Level of Function- Swallowing no diet consistency restrictions  -AW no diet consistency restrictions  -JT       Plans/Goals Discussed With patient  -AW patient;agreed upon  -JT       Barriers to Rehab none identified  -AW none identified  -JT       Clinical Impression    Patient's Goals For Discharge return to all previous roles/activities  -AW patient did not state  -JT       SLP Swallowing Diagnosis  mild dysphagia;oral dysfunction;pharyngeal dysfunction;moderate dysphagia  -JT       Rehab Potential/Prognosis, Swallowing good, to achieve stated therapy goals  -AW good, to achieve stated therapy goals  -JT       Criteria for Skilled Therapeutic Interventions Met skilled criteria for dysphagia intervention met  -AW demonstrates skilled criteria for intervention  -JT       FCM, Swallowing 7-->Level 7  -AW        Therapy Frequency PRN  -AW evaluation only  -JT       Predicted Duration Therapy Interv (days) 2-4 days  -AW until discharge  -JT       Expected Duration Therapy Session (min) 15-30 minutes  -AW        SLP Diet Recommendation regular textures;thin liquids  -AW regular textures;nectar/syrup-thick liquids   NO MIXED CONSISTENCIES  -JT       Recommended Diagnostics  reassess via  clinical swallow (non-instrumental exam)  -JT       Recommended Feeding/Eating Techniques maintain upright posture during/after eating for 30 mins;small sips/bites  -AW no straws;small sips/bites  -JT       SLP Rec. for Method of Medication Administration meds whole with thin liquid;meds whole in pudding/applesauce  -AW meds whole in pudding/applesauce;meds crushed in pudding/applesauce  -JT       Monitor For Signs Of Aspiration cough;elevated WBC count;gurgly voice;throat clearing;fever;upper respiratory infection;pneumonia;right lower lobe infiltrates  -AW cough;elevated WBC count;gurgly voice;throat clearing;fever;upper respiratory infection;pneumonia;right lower lobe infiltrates  -JT       Anticipated Discharge Disposition home  -AW other (see comments)   unknown  -JT       Pain Assessment    Pain Assessment  No/denies pain  -JT       Cognitive Assessment/Intervention    Current Cognitive/Communication Assessment functional  -AW functional  -JT       Orientation Status oriented x 4  -AW oriented to;person;place  -JT       Follows Commands/Answers Questions 100% of the time  -% of the time;able to follow single-step instructions  -JT       Personal Safety WNL/WFL  -AW        Personal Safety Interventions fall prevention program maintained  -AW        Oral Motor Structure and Function    Oral Motor Anatomy and Physiology patient demonstrates anatomy that is WNL  -AW patient demonstrates anatomy that is WNL  -JT       Dentition Assessment present and adequate  -AW present and adequate  -JT       Secretion Management WNL/WFL  -AW WNL/WFL  -JT       Mucosal Quality moist, healthy  -AW moist, healthy  -JT       Velar Elevation  WNL (within normal limits)  -JT       Volitional Swallow  no difficulties initiating volitional swallow  -JT       Volitional Cough  weak volitional cough  -JT       Oral Musculature General Assessment oral labial impairment  -AW oral labial impairment  -JT       Oral Labial Strength and  Mobility right labial droop;other (see comments)   good active movement  -AW right labial droop  -JT       Clinical Swallow Exam    Mode of Presentation  fed by clinician;cup;spoon;straw  -JT       Oral Residue  cohesive solid:;tongue residue   mild  -JT       Oral Phase Results  intact oral phase without signs of dysfunction  -JT       Pharyngeal Phase Results  cough;throat clear;sign/symptoms of pharyngeal impairment   wet vocal quality w/thin, mixed   -JT       Summary of Clinical Exam  Pt w/ mild-mod oropharyngeal dysphagia characterized by general weakness, wet voice, cough and throat clear with thin and mixed consistency, and mild oral residue with regular solids.   -JT         User Key  (r) = Recorded By, (t) = Taken By, (c) = Cosigned By    Initials Name Effective Dates    AW Vandana Hagan, MS [facility]-SLP 04/13/15 -     JT Anupama Zapata 12/11/15 -         EDUCATION  The patient has been educated in the following areas:   Dysphagia (Swallowing Impairment).    SLP Recommendation and Plan     SLP Diet Recommendation: regular textures, thin liquids  Recommended Feeding/Eating Techniques: maintain upright posture during/after eating for 30 mins, small sips/bites  SLP Rec. for Method of Medication Administration: meds whole with thin liquid, meds whole in pudding/applesauce  Monitor For Signs Of Aspiration: cough, elevated WBC count, gurgly voice, throat clearing, fever, upper respiratory infection, pneumonia, right lower lobe infiltrates     Criteria for Skilled Therapeutic Interventions Met: skilled criteria for dysphagia intervention met  Anticipated Discharge Disposition: home  Rehab Potential/Prognosis, Swallowing: good, to achieve stated therapy goals  Therapy Frequency: PRN                        IP SLP Goals       01/04/18 1126          Safely Consume Diet    Safely Consume Diet- SLP, Time to Achieve by discharge  -JT      Safely Consume Diet- SLP, Outcome goal ongoing  -JT        User Key  (r) = Recorded  By, (t) = Taken By, (c) = Cosigned By    Initials Name Provider Type    JOSE Zapata Speech and Language Pathologist             SLP Outcome Measures (last 72 hours)      SLP Outcome Measures       01/05/18 1600 01/04/18 1300       SLP Outcome Measures    Outcome Measure Used? Adult NOMS  -AW Adult NOMS  -JT     FCM Scores    FCM Chosen Swallowing  -AW Swallowing  -JT     Swallowing FCM Score 7  -AW 5  -JT       User Key  (r) = Recorded By, (t) = Taken By, (c) = Cosigned By    Initials Name Effective Dates     Vandana Hagan MS CCC-SLP 04/13/15 -     JOSE Zapata 12/11/15 -            Time Calculation:         Time Calculation- SLP       01/05/18 1610          Time Calculation- SLP    SLP Start Time 1530  -      SLP Stop Time 1600  -      SLP Time Calculation (min) 30 min  -      SLP Received On 01/05/18  -        User Key  (r) = Recorded By, (t) = Taken By, (c) = Cosigned By    Initials Name Provider Type    AW Vandana Hagan MS CCC-SLP Speech and Language Pathologist          Therapy Charges for Today     Code Description Service Date Service Provider Modifiers Qty    [id number] HC ST TREATMENT SWALLOW 2 1/5/2018 Vandana Hagan MS CCC-SLP GN 1               Vandana Hagan MS CCC-BULMARO  1/5/2018

## 2018-01-05 NOTE — CONSULTS
Referring Provider: Vincent Hanson MD  Reason for Consultation: HTN    Subjective     Chief complaint   Chief Complaint   Patient presents with   • Neuro Deficit(s)     RIGHT SIDED FACIAL DROOP, RIGHT SIDED ARM AND LEG WEAKNESS, went to bed around midnight then woke around 1pm with symptoms   • Hypertension     reports bp is normally as high as now       History of present illness:      66 Y/O with PMH of poorly controlled HTN who presented to the hospital with right sided weakness. MRI  of the brain showed multiple acute areas of acute infarction within the lateral aspect of the left frontal, parietal, occipital and temporal lobes within the left middle cerebral artery distributione a day .  MRA shows multiple intracranial stenoses particularly in the left middle cerebral artery as well as the basilar artery.  . Her pressure was very high on admission 205/103.We were consulted to help in management of her HTN. She has been having high BP since late 30th. She has been on an off BP meds with few changes lately. She denied bouts of elevated BP and she believes that her BP has been high all along!    Past Medical History:   Diagnosis Date   • Hypertension    • TIA (transient ischemic attack)      Past Surgical History:   Procedure Laterality Date   • TONSILLECTOMY     • TUBAL ABDOMINAL LIGATION     • WISDOM TOOTH EXTRACTION       Family History   Problem Relation Age of Onset   • Hyperlipidemia Mother    • Cancer Father      Urinary bladder     Social History   Substance Use Topics   • Smoking status: Never Smoker   • Smokeless tobacco: None   • Alcohol use No     Prescriptions Prior to Admission   Medication Sig Dispense Refill Last Dose   • CloNIDine (CATAPRES) 0.2 MG tablet Take 0.2 mg by mouth 2 (Two) Times a Day.      • diltiaZEM CD (CARDIZEM CD) 240 MG 24 hr capsule Take 240 mg by mouth Daily.      • losartan-hydrochlorothiazide (HYZAAR) 100-25 MG per tablet Take 1 tablet by mouth Daily.     "    Allergies:  Budesonide; Crestor [rosuvastatin calcium]; Lipitor [atorvastatin]; Zocor [simvastatin]; Neomycin-bacitracin zn-polymyx; and Vitamin e    Review of Systems  14 points ROS were conducted and were all negative except what is HPI    Objective     Vital Signs  Temp:  [98 °F (36.7 °C)-98.7 °F (37.1 °C)] 98 °F (36.7 °C)  Heart Rate:  [53-68] 65  Resp:  [16-18] 16  BP: (182-221)/() 213/113    Flowsheet Rows         First Filed Value    Admission Height  152.4 cm (60\") Documented at 01/03/2018 1802    Admission Weight  77.1 kg (170 lb) Documented at 01/03/2018 1802              I/O last 3 completed shifts:  In: 1000 [I.V.:1000]  Out: -     Intake/Output Summary (Last 24 hours) at 01/05/18 1452  Last data filed at 01/04/18 2231   Gross per 24 hour   Intake             1000 ml   Output                0 ml   Net             1000 ml       Physical Exam:     General Appearance:    Alert, cooperative, in no acute distress   Head:    Normocephalic, without obvious abnormality, atraumatic   Eyes:            Lids and lashes normal, conjunctivae and sclerae normal, no   icterus, no pallor, corneas clear, PERRLA   Ears:    Ears appear intact with no abnormalities noted   Throat:   No oral lesions, no thrush, oral mucosa moist   Neck:   No adenopathy, supple, trachea midline, no thyromegaly, no     carotid bruit, no JVD   Back:     No kyphosis present, no scoliosis present, no skin lesions,       erythema or scars, no tenderness to percussion or                   palpation,   range of motion normal   Lungs:     Clear to auscultation,respirations regular, even and                   unlabored    Heart:    Regular rhythm and normal rate, normal S1 and S2, no            murmur, no gallop, no rub, no click   Breast Exam:    Deferred   Abdomen:     Normal bowel sounds, no masses, no organomegaly, soft        non-tender, non-distended, no guarding, no rebound                 tenderness   Genitalia:    Deferred "   Extremities:   Moves all extremities well, no edema, no cyanosis, no              redness   Pulses:   Pulses palpable and equal bilaterally   Skin:   No bleeding, bruising or rash   Lymph nodes:   No palpable adenopathy   Neurologic:   Cranial nerves 2 - 12 grossly intact, sensation intact, DTR        present and equal bilaterally       Results Review:    Results from last 7 days  Lab Units 01/04/18  0442 01/03/18  1845   SODIUM mmol/L 141 144   POTASSIUM mmol/L 3.5 3.1*   CHLORIDE mmol/L 101 103   CO2 mmol/L 30.2* 28.6   BUN mg/dL 8 8   CREATININE mg/dL 0.65 0.73   CALCIUM mg/dL 9.0 9.4   BILIRUBIN mg/dL 0.6 0.6   ALK PHOS U/L 74 79   ALT (SGPT) U/L 24 27   AST (SGOT) U/L 10 14   GLUCOSE mg/dL 97 110*       Estimated Creatinine Clearance: 64.3 mL/min (by C-G formula based on Cr of 0.65).      Results from last 7 days  Lab Units 01/04/18  0442   MAGNESIUM mg/dL 2.5*         Results from last 7 days  Lab Units 01/04/18  0442 01/03/18  1845   WBC 10*3/mm3 9.78 11.76*   HEMOGLOBIN g/dL 14.7 15.2   PLATELETS 10*3/mm3 281 298         Results from last 7 days  Lab Units 01/04/18  0755 01/03/18  1845   INR  1.08 0.95       Active Medications    aspirin 325 mg Oral Daily   Or      aspirin 300 mg Rectal Daily   CloNIDine 0.2 mg Oral Q12H   clopidogrel 75 mg Oral Daily   losartan 100 mg Oral Q24H   And      hydrochlorothiazide 25 mg Oral Daily   pravastatin 20 mg Oral Q PM   spironolactone 50 mg Oral Daily          Assessment/Plan       1. HTN Urgency:Secondary HTN will need to be ruled out. Pending Renin/Aldosteron. Will check free serum metanephrine. Will obtain renal doppler to rule out renal artery stenosis. Will benefit from aldactone given her low potassium. Will start her on aldactone 100 daily and increase clonidine to 0.2mg every 8 hours. Avoid rapid /aggressive correction of BP.    2. Hypokalemia: Replete and check mag  3. Acute CVA: On aspirin and plavix  4. Hyperlipidemia          Gaby Becerril,  MD  01/05/18  2:52 PM

## 2018-01-05 NOTE — CONSULTS
ENDOCRINOLOGY CONSULTATION    REQUESTING PHYSICIAN: Dr. Ruiz.     REASON FOR CONSULTATION: Treatment of hyperlipidemia.     PRESENT ILLNESS: The patient is a 67-year-old female who was admitted to the hospital for right-sided weakness. MRI of the brain showed multiple acute areas of acute infarction within the lateral aspect of the left frontal, parietal, occipital and temporal lobes within the left middle cerebral artery distribution. Two subcentimeter foci encephalomalacia within the left parietal lobe compatible with small chronic infarcts. 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 The right vertebral artery is not well evaluated. There is a 30% stenosis of the proximal internal carotid artery. The patient's blood pressure also was elevated and she is presently on clonidine 0.2 mg every 12 hours, Losartan/hydrochlorothiazide 100/25 mg once a day.    The patient has known hyperlipidemia for many years. She has tried Zocor, Lipitor and Crestor more than 10 years ago and they were discontinued because she developed hip pain. She has not used Pravastatin, Livalo, or Zetia in the past.     She denies any history of diabetes mellitus.     PAST MEDICAL HISTORY:   1. Hypertension for more than 30 years.      2. Previous tonsillectomy.  3. Tubal ligation.  4. [place] tooth extraction.      She has never had a colonoscopy.     ALLERGIES:  1. BUDESONIDE (caused blister).  2. NEOMYCIN.   3. VITAMIN E.  4. ZOCOR, LIPITOR AND CRESTOR (caused hip pain).       FAMILY HISTORY: Her mother has hyperlipidemia. Her father had urinary bladder cancer. No history of diabetes mellitus in the family.      REVIEW OF SYSTEMS: She denies any fever,chills, cough or cold symptoms. She denies shortness of breath. She denies palpitations, chest pain, headaches, or diaphoresis. She denies abdominal pain, nausea, vomiting, diarrhea or constipation. She denies melena, hematochezia, hematemesis, hematuria or dysuria. She denies any  numbness or tingling. She denies seizures or loss of consciousness. She denies incontinence.     PHYSICAL EXAMINATION:   VITAL SIGNS: Blood pressure is 190/80, respiratory rate 18, heart rate 60, temperature 98.2 degrees Fahrenheit.   GENERAL: Patient is awake, alert, oriented, not dyspneic, not tachypnic, not orthopneic, not in acute distress.  HEENT: Pink conjunctivae. Sclerae anicteric. There is no xanthelasma. Full extraocular muscle movement. Shallow right nasolabial fold. Tongue is midline. Speech is fluent.  NECK: No carotid bruits. The thyroid is not enlarged. There is no cervical lymphadenopathy.   LUNGS: Equal chest expansion. No rales, no wheezes.  HEART:Regular heart rate and rhythm. No gallop.  ABDOMEN: Soft. Nontender.  Bowel sounds are active. No prominent striae.   EXTREMITIES: Warm, no cyanosis, pedal edema or clubbing. There is mild paresis of the right upper extremity. Light touch sensation is grossly intact. There is no calf tenderness. There are no xanthomas.    IMPRESSION:  1. Stroke. .  2. Uncontrolled hypertension.   3. Hyperlipidemia.     RECOMMENDATIONS:  1. Start patient on Pravastatin 20 mg once a day.     2. Revise diet to low fat cardiac diet.        I will defer blood pressure control to the primary physician. I will defer treatment of the stroke to the neurologist.    Thank you for the referral. I will follow the patient with you during her hospital stay.

## 2018-01-05 NOTE — PLAN OF CARE
Problem: Inpatient SLP  Goal: Dysphagia- Patient will safely consume diet as per recommendation with no signs/symptoms of aspiration  Outcome: Ongoing (interventions implemented as appropriate)   01/04/18 1126   Safely Consume Diet   Safely Consume Diet- SLP, Time to Achieve by discharge   Safely Consume Diet- SLP, Outcome goal ongoing

## 2018-01-05 NOTE — THERAPY DISCHARGE NOTE
Acute Care - Occupational Therapy Initial Eval/Discharge  [facility]     Patient Name: Roberta Houston  : 1950  MRN: [id number]  Today's Date: 2018  Onset of Illness/Injury or Date of Surgery Date: 18     Referring Physician: Veronika Moon      Admit Date: 1/3/2018       ICD-10-CM ICD-9-CM   1. Acute CVA (cerebrovascular accident) I63.9 434.91   2. Essential hypertension I10 401.9     Patient Active Problem List   Diagnosis   • Acute CVA (cerebrovascular accident)   • HTN (hypertension)   • Dyslipidemia   • Intracranial atherosclerosis     Past Medical History:   Diagnosis Date   • Hypertension    • TIA (transient ischemic attack)      Past Surgical History:   Procedure Laterality Date   • TONSILLECTOMY     • TUBAL ABDOMINAL LIGATION     • WISDOM TOOTH EXTRACTION            OT ASSESSMENT FLOWSHEET (last 72 hours)      OT Evaluation       18 1221 18 1200 18 1745 18 1700 18 1538    Rehab Evaluation    Document Type  evaluation  -HC   evaluation  -PC    Subjective Information  agree to therapy;no complaints  -HC   agree to therapy  -PC    Patient Effort, Rehab Treatment  good  -HC   good  -PC    Symptoms Noted During/After Treatment  fatigue  -HC       General Information    Patient Profile Review  yes  -HC   yes  -PC    Onset of Illness/Injury or Date of Surgery Date     18  -PC    Referring Physician     Veronika Moon  -PC    General Observations  pt supine in bed in no acute distress, agreeable with plan  -HC   pt is a pleasant white female in bed, no acute dsitress  -PC    Pertinent History Of Current Problem  admitted w/R side weakness, dysphasia, found to have small new L side infarct as well as several old small infarcts  -HC   adm with R sided weakness, dysphasia, fond to have small new L side infarct as well as several old small infarcts  -PC    Prior Level of Function  independent:  -HC   independent:;all household mobility;community mobility;work  -PC     Equipment Currently Used at Home  none  -HC none  -RO none  -MS     Plans/Goals Discussed With  patient  -HC       Risks Reviewed  patient:  -HC       Benefits Reviewed  patient:  -HC       Barriers to Rehab  none identified  -HC       Living Environment    Lives With  alone  -HC  alone  -MS     Living Arrangements  house  -HC  house  -MS     Home Accessibility  no concerns  -HC       Stair Railings at Home  none  -HC       Type of Financial/Environmental Concern  none  -HC       Transportation Available  car;family or friend will provide  -HC  car;family or friend will provide  -MS     Clinical Impression    Impairments Found (describe specific impairments)  other (see comments)   mild R hand motor coordination  -HC       Patient/Family Goals Statement  to return home at d/c  -       Criteria for Skilled Therapeutic Interventions Met  no  -HC       Anticipated Discharge Disposition home  -HC home  -HC       Functional Level Prior    Ambulation    0-->independent  -MS     Transferring    0-->independent  -MS     Toileting    0-->independent  -MS     Bathing    0-->independent  -MS     Dressing    0-->independent  -MS     Eating    0-->independent  -MS     Communication    0-->understands/communicates without difficulty  -MS     Swallowing    0-->swallows foods/liquids without difficulty  -MS     Pain Assessment    Pain Assessment  No/denies pain  -   No/denies pain  -PC    Vision Assessment/Intervention    Visual Impairment  WFL with corrective lenses  -       Cognitive Assessment/Intervention    Current Cognitive/Communication Assessment  functional  -HC   functional  -PC    Orientation Status  oriented x 4  -HC   oriented x 4  -    Follows Commands/Answers Questions  100% of the time  -HC   100% of the time  -PC    Personal Safety  WNL/WFL  -HC   WNL/WFL  -PC    Personal Safety Interventions  fall prevention program maintained;gait belt;nonskid shoes/slippers when out of bed  -   fall prevention program  maintained;gait belt  -PC    ROM (Range of Motion)    General ROM     no range of motion deficits identified  -PC    General ROM Detail  BUE WFL  -HC       MMT (Manual Muscle Testing)    General MMT Assessment     no strength deficits identified  -PC    General MMT Assessment Detail  E WFL  -HC       Bed Mobility, Assessment/Treatment    Bed Mob, Supine to Sit, [place]  independent  -HC   independent  -PC    Bed Mob, Sit to Supine, [place]  independent  -HC   independent  -PC    Transfer Assessment/Treatment    Transfers, Sit-Stand [place]  independent  -HC   independent  -PC    Transfers, Stand-Sit [place]  independent  -HC   independent  -PC    Upper Body Bathing Assessment/Training    UB Bathing Assess/Train, Position  sitting;sink side  -HC       UB Bathing Assess/Train, [place] Level  conditional independence  -HC       Lower Body Bathing Assessment/Training    LB Bathing Assess/Train, Position  sitting;sink side  -HC       LB Bathing Assess/Train, [place] Level  conditional independence  -HC       Upper Body Dressing Assessment/Training    UB Dressing Assess/Train, Position  sitting;sink side  -HC       UB Dressing Assess/Train, [place]  conditional independence  -HC       Lower Body Dressing Assessment/Training    LB Dressing Assess/Train, Position  sitting;sink side  -HC       LB Dressing Assess/Train, [place]  conditional independence  -HC       Grooming Assessment/Training    Grooming Assess/Train, Position  sitting;sink side  -HC       Grooming Assess/Train, Indepen Level  conditional independence  -HC       Motor Skills/Interventions    Additional Documentation  Functional Endurance (Group)  -HC       Functional Endurance    Detail (Functional Endurance)  good  -HC       Positioning and Restraints    Pre-Treatment Position  in bed  -HC   in bed  -PC    Post Treatment Position  bathroom  -HC   bed  -PC    In Bed     supine;call light within reach;encouraged to  call for assist;exit alarm on  -PC    In Chair  --  -       Bathroom  notified nsg;sitting   in BR sitting at sink sponge bathing  -         01/04/18 1248 01/04/18 1130 01/04/18 1117 01/04/18 0956 01/04/18 0509    Rehab Evaluation    Document Type  evaluation  -JT       Subjective Information  agree to therapy;complains of;fatigue  -JT       Evaluation Not Performed --   pt off floor to cardio. Will follow up next service date  -LE  --   with SLP.  RN aware  -LE patient unavailable for evaluation   Pt NEEL for testing.SLP to try in PM.  -JT     Patient Effort, Rehab Treatment  adequate  -JT       Symptoms Noted During/After Treatment  fatigue   pt had been given ativan prior to earlier MRI  -JT       General Information    Equipment Currently Used at Home     none  -AN    Living Environment    Transportation Available     car;family or friend will provide  -AN    Pain Assessment    Pain Assessment  No/denies pain  -JT       Cognitive Assessment/Intervention    Current Cognitive/Communication Assessment  functional  -JT       Orientation Status  oriented to;person;place  -JT       Follows Commands/Answers Questions  100% of the time;able to follow single-step instructions  -JT         01/03/18 0625                General Information    Equipment Currently Used at Home none  -AN        Living Environment    Lives With alone  -AN        Living Arrangements house  -AN        Home Accessibility no concerns  -AN        Stair Railings at Home none  -AN        Type of Financial/Environmental Concern none  -AN        Transportation Available car;family or friend will provide  -AN        Functional Level Prior    Ambulation 0-->independent  -AN        Transferring 0-->independent  -AN        Toileting 0-->independent  -AN        Bathing 0-->independent  -AN        Dressing 0-->independent  -AN        Eating 0-->independent  -AN        Communication 0-->understands/communicates without difficulty  -AN        Swallowing  0-->swallows foods/liquids without difficulty  -AN          User Key  (r) = Recorded By, (t) = Taken By, (c) = Cosigned By    Initials Name Effective Dates    FLORIDALMA Au, OTR 04/13/15 -     AMI Price, PT 12/01/15 -     AN Gemini Kohli, RN 03/17/17 -     JOSE Zapata 12/11/15 -     MS Deandre Zaldivar, RN 02/18/16 -     HC Leslee Lloyd, OTR 08/17/17 -     MI Szymanski RN 11/09/17 -           Occupational Therapy Education     Title: PT OT SLP Therapies (Done)     Topic: Occupational Therapy (Done)     Point: ADL training (Done)    Description: Instruct learner(s) on proper safety adaptation and remediation techniques during self care or transfers.   Instruct in proper use of assistive devices.    Learning Progress Summary    Learner Readiness Method Response Comment Documented by Status   Patient Eager E VU Pt given gross and fine motor coordination HEP and blue and red foam lock. Pt able to demo and verbalize understanding.  01/05/18 1218 Done               Point: Home exercise program (Done)    Description: Instruct learner(s) on appropriate technique for monitoring, assisting and/or progressing therapeutic exercises/activities.    Learning Progress Summary    Learner Readiness Method Response Comment Documented by Status   Patient Eager E VU Pt given gross and fine motor coordination HEP and blue and red foam lock. Pt able to demo and verbalize understanding.  01/05/18 1218 Done               Point: Precautions (Done)    Description: Instruct learner(s) on prescribed precautions during self-care and functional transfers.    Learning Progress Summary    Learner Readiness Method Response Comment Documented by Status   Patient Eager E VU Pt given gross and fine motor coordination HEP and blue and red foam lock. Pt able to demo and verbalize understanding.  01/05/18 1218 Done               Point: Body mechanics (Done)    Description: Instruct learner(s) on proper positioning  and spine alignment during self-care, functional mobility activities and/or exercises.    Learning Progress Summary    Learner Readiness Method Response Comment Documented by Status   Patient Eager E VU Pt given gross and fine motor coordination HEP and blue and red foam lock. Pt able to demo and verbalize understanding.  01/05/18 1218 Done                      User Key     Initials Effective Dates Name Provider Type [facility] 08/17/17 -  WADE Blair Occupational Therapist OT                OT Recommendation and Plan  Anticipated Discharge Disposition: home  Plan of Care Review  Plan Of Care Reviewed With: patient  Outcome Summary/Follow up Plan: Pt presented to OT eval alert and oriented x4 and at baseline with mobility and ADLs. Pt with mild FMC and GMC deficits in R hand. Given and educated on HEP with red and blue foam block, able to demo and verbalize good understanding. No further OT services warranted. Will sign off, please f/u with any changes.           OT Goals       01/05/18 1219          Patient Education OT LTG    Patient Education OT LTG, Date Established 01/05/18  -HC      Patient Education OT LTG, Time to Achieve 1 wk  -HC      Patient Education OT LTG, Education Type HEP  -HC      Patient Education OT LTG, Additional Goal gross and fine motor coordination HEP  -HC        User Key  (r) = Recorded By, (t) = Taken By, (c) = Cosigned By    Initials Name Provider Type     WADE Blair Occupational Therapist                Outcome Measures       01/05/18 1200 01/04/18 1500       How much help from another person do you currently need...    Turning from your back to your side while in flat bed without using bedrails?  4  -PC     Moving from lying on back to sitting on the side of a flat bed without bedrails?  4  -PC     Moving to and from a bed to a chair (including a wheelchair)?  4  -PC     Standing up from a chair using your arms (e.g., wheelchair, bedside chair)?  4  -PC      Climbing 3-5 steps with a railing?  4  -PC     To walk in hospital room?  4  -PC     AM-PAC 6 Clicks Score  24  -PC     How much help from another is currently needed...    Putting on and taking off regular lower body clothing? 4  -HC      Bathing (including washing, rinsing, and drying) 4  -HC      Toileting (which includes using toilet bed pan or urinal) 4  -HC      Putting on and taking off regular upper body clothing 4  -HC      Taking care of personal grooming (such as brushing teeth) 4  -HC      Eating meals 4  -HC      Score 24  -HC      Functional Assessment    Outcome Measure Options AM-PAC 6 Clicks Daily Activity (OT)  -HC AM-PAC 6 Clicks Basic Mobility (PT)  -PC       User Key  (r) = Recorded By, (t) = Taken By, (c) = Cosigned By    Initials Name Provider Type     Tabitha Price, PT Physical Therapist     WADE Blair Occupational Therapist          Time Calculation:         Time Calculation- OT       01/05/18 1222          Time Calculation- OT    OT Start Time 1108  -      OT Stop Time 1125  -      OT Time Calculation (min) 17 min  -      OT Received On 01/05/18  -        User Key  (r) = Recorded By, (t) = Taken By, (c) = Cosigned By    Initials Name Provider Type     WADE Blair Occupational Therapist          Therapy Charges for Today     Code Description Service Date Service Provider Modifiers Qty    [id number]  OT EVAL LOW COMPLEXITY 2 1/5/2018 WAED Blair GO 1    [id number]  OT NEUROMUSC RE EDUCATION EA 15 MIN 1/5/2018 WADE Blair GO 1               OT Discharge Summary  Anticipated Discharge Disposition: home    WADE Blair  1/5/2018

## 2018-01-06 ENCOUNTER — APPOINTMENT (OUTPATIENT)
Dept: CARDIOLOGY | Facility: HOSPITAL | Age: 68
End: 2018-01-06
Attending: INTERNAL MEDICINE

## 2018-01-06 LAB
25(OH)D3 SERPL-MCNC: 45.7 NG/ML (ref 30–100)
BH CV ECHO MEAS - DIST REN A EDV LEFT: 17.1 CM/SEC
BH CV ECHO MEAS - DIST REN A PSV LEFT: 66.7 CM/SEC
BH CV ECHO MEAS - DIST REN A RI LEFT: 0.74
BH CV ECHO MEAS - HILAR A EDV LEFT: 18.3 CM/SEC
BH CV ECHO MEAS - HILAR A PSV LEFT: 70.9 CM/SEC
BH CV ECHO MEAS - HILAR A RI LEFT: 0.74
BH CV ECHO MEAS - MID REN A EDV LEFT: 18.3 CM/SEC
BH CV ECHO MEAS - MID REN A PSV LEFT: 95.7 CM/SEC
BH CV ECHO MEAS - MID REN A RI LEFT: 0.86
BH CV ECHO MEAS - PROX REN A EDV LEFT: 22 CM/SEC
BH CV ECHO MEAS - PROX REN A PSV LEFT: 111 CM/SEC
BH CV ECHO MEAS - PROX REN A RI LEFT: 0.8
BH CV VAS BP LEFT ARM: NORMAL MMHG
BH CV VAS BP RIGHT ARM: NORMAL MMHG
BH CV VAS RENAL AORTIC MID EDV: 11.9 CM/S
BH CV VAS RENAL AORTIC MID PSV: 96.7 CM/S
BH CV VAS RENAL HILUM LEFT EDV: 18.3 CM/S
BH CV VAS RENAL HILUM LEFT PSV: 70.9 CM/S
BH CV VAS RENAL HILUM RIGHT EDV: 12.7 CM/S
BH CV VAS RENAL HILUM RIGHT PSV: 43.7 CM/S
BH CV XLRA MEAS - KID L LEFT: 9.45 CM
BH CV XLRA MEAS - RENAL A ORG RI LEFT: 0.77
BH CV XLRA MEAS DIST REN A EDV RIGHT: 12.5 CM/SEC
BH CV XLRA MEAS DIST REN A PSV RIGHT: 62.5 CM/SEC
BH CV XLRA MEAS DIST REN A RI RIGHT: 0.8
BH CV XLRA MEAS HILAR A EDV RIGHT: 12.7 CM/SEC
BH CV XLRA MEAS HILAR A PSV RIGHT: 43.7 CM/SEC
BH CV XLRA MEAS HILAR A RI RIGHT: 0.71
BH CV XLRA MEAS KID L RIGHT: 10.32 CM
BH CV XLRA MEAS KID W RIGHT: 4.43 CM
BH CV XLRA MEAS MID REN A EDV RIGHT: 19.1 CM/SEC
BH CV XLRA MEAS MID REN A PSV RIGHT: 93.2 CM/SEC
BH CV XLRA MEAS MID REN A RI RIGHT: 0.8
BH CV XLRA MEAS PROX REN A EDV RIGHT: 24.2 CM/SEC
BH CV XLRA MEAS PROX REN A PSV RIGHT: 104 CM/SEC
BH CV XLRA MEAS PROX REN A RI RIGHT: 0.77
BH CV XLRA MEAS RAR LEFT: 1.1
BH CV XLRA MEAS RAR RIGHT: 1
BH CV XLRA MEAS RENAL A ORG EDV LEFT: 24.6 CM/SEC
BH CV XLRA MEAS RENAL A ORG EDV RIGHT: 21 CM/SEC
BH CV XLRA MEAS RENAL A ORG PSV LEFT: 109 CM/SEC
BH CV XLRA MEAS RENAL A ORG PSV RIGHT: 78.4 CM/SEC
BH CV XLRA MEAS RENAL A ORG RI RIGHT: 0.73
HCYS SERPL-SCNC: 10.6 UMOL/L (ref 0–15)
LEFT KIDNEY WIDTH: 4.84 CM
LEFT RENAL UPPER PARENCHYMA MAX: 16.1 CM/S
LEFT RENAL UPPER PARENCHYMA MIN: 4.26 CM/S
LEFT RENAL UPPER PARENCHYMA RI: 0.74
MAGNESIUM SERPL-MCNC: 2.4 MG/DL (ref 1.6–2.4)
RIGHT RENAL UPPER PARENCHYMA MAX: 32.8 CM/S
RIGHT RENAL UPPER PARENCHYMA MIN: 7.02 CM/S
RIGHT RENAL UPPER PARENCHYMA RI: 0.79

## 2018-01-06 PROCEDURE — 83735 ASSAY OF MAGNESIUM: CPT | Performed by: INTERNAL MEDICINE

## 2018-01-06 PROCEDURE — 99233 SBSQ HOSP IP/OBS HIGH 50: CPT | Performed by: NURSE PRACTITIONER

## 2018-01-06 PROCEDURE — 93975 VASCULAR STUDY: CPT

## 2018-01-06 PROCEDURE — 82306 VITAMIN D 25 HYDROXY: CPT | Performed by: INTERNAL MEDICINE

## 2018-01-06 RX ADMIN — Medication 200 MG: at 10:32

## 2018-01-06 RX ADMIN — ASPIRIN 325 MG: 325 TABLET ORAL at 10:30

## 2018-01-06 RX ADMIN — ROSUVASTATIN CALCIUM 10 MG: 10 TABLET, FILM COATED ORAL at 21:45

## 2018-01-06 RX ADMIN — HYDROCHLOROTHIAZIDE 25 MG: 25 TABLET ORAL at 10:31

## 2018-01-06 RX ADMIN — CLOPIDOGREL 75 MG: 75 TABLET, FILM COATED ORAL at 10:30

## 2018-01-06 RX ADMIN — CLONIDINE HYDROCHLORIDE 0.2 MG: 0.1 TABLET ORAL at 21:45

## 2018-01-06 RX ADMIN — CLONIDINE HYDROCHLORIDE 0.2 MG: 0.1 TABLET ORAL at 06:37

## 2018-01-06 RX ADMIN — SPIRONOLACTONE 100 MG: 100 TABLET, FILM COATED ORAL at 10:30

## 2018-01-06 RX ADMIN — CLONIDINE HYDROCHLORIDE 0.2 MG: 0.1 TABLET ORAL at 13:54

## 2018-01-06 RX ADMIN — LOSARTAN POTASSIUM 100 MG: 100 TABLET ORAL at 10:31

## 2018-01-06 NOTE — PROGRESS NOTES
"  ENDOCRINE    Subjective   AND PLANS  Roberta Houston is a 67 y.o. female.     Follow-up lipids.    BP still running high.  Nephrologist consultation noted.  Renin and aldosterone results pending.  Will defer blood pressure control to primary physician and nephrologist.    Neurologist has switched patient to Crestor taken along with CoQ 10.  There is no strong data that supports CoQ 10 will prevent statin myopathy but it is a reasonable approach.  Will defer monitoring for statin myopathy to neurologist.  Check vitamin D and treat if deficient.  Vitamin D supplementation may also help with statin myopathy.  Please call if needed.  Will sign off.  Thank you    Objective   BP (!) 200/100 (BP Location: Left arm, Patient Position: Lying)  Pulse 59  Temp 98.1 °F (36.7 °C) (Oral)   Resp 16  Ht 152.4 cm (60\")  Wt 77.1 kg (170 lb)  SpO2 93%  BMI 33.2 kg/m2  Physical Exam    Awake, alert, not in distress.  No rales or wheezes.  Regular heart rate and rhythm.  Abdomen soft, nontender  Mild right hemiparesis, worse on upper extremity.    Lab Results (last 24 hours)     Procedure Component Value Units Date/Time    T4, Free [[id number]]  (Normal) Collected:  01/04/18 2131    Specimen:  Blood Updated:  01/04/18 2157     Free T4 1.14 ng/dL     TSH [[id number]]  (Normal) Collected:  01/04/18 2131    Specimen:  Blood Updated:  01/04/18 2157     TSH 2.270 mIU/mL     Renin Activity & Aldosterone [[id number]] Collected:  01/05/18 0448    Specimen:  Blood Updated:  01/05/18 0523    RPR [[id number]]  (Normal) Collected:  01/05/18 0448    Specimen:  Blood Updated:  01/05/18 1031     RPR Non-Reactive    Metanephrines, Pheochromocyt - Urine, Clean Catch [[id number]] Collected:  01/05/18 1512    Specimen:  Urine from Urine, Clean Catch Updated:  01/05/18 1524    Metanephrines, Frac. Free, Plasma [[id number]] Collected:  01/05/18 1551    Specimen:  Blood Updated:  01/05/18 1558    P2Y12 Platelet Inhibition [[id number]]  (Abnormal) Collected:  " 01/05/18 1551    Specimen:  Blood Updated:  01/05/18 1638     P2Y12 Platelet Inhibition 189 (L) PRU     Narrative:       Test results are in P2Y12 reaction units (PRU). This measures the extent of platelet aggregation in the presence of P2Y12 inhibitor drugs such as clopidrogrel (Plavix), prasugrel (Effient), ticagrelor (Brilinta), and ticlopidine (Ticlid).   Pre-Drug normal reference range: 194 - 418 PRU   P2Y12 values <194 (low end of reference range) are specific evidence of a P2Y12 inhibitor effect   Patients who have been treated with Glycoprotein IIb/IIIa inhibitors should not be tested until platelet function has recovered. This time period is approximately 14 days after discontinuation of abciximab (ReoPro) and up to 48 hours after discontinuation of eptifibatide (Integrillin) and tirofiban (Aggratstat).   Results should be interpreted in conjuction with other laboratory and clinical data available to the clinician.    Vitamin B12 [[id number]]  (Normal) Collected:  01/05/18 1551    Specimen:  Blood Updated:  01/05/18 1708     Vitamin B-12 450 pg/mL             Principal Problem:    Acute CVA (cerebrovascular accident)  Active Problems:    HTN (hypertension)    Dyslipidemia    Intracranial atherosclerosis    Will sign off.  Thank you

## 2018-01-06 NOTE — PROGRESS NOTES
"   LOS: 3 days    Patient Care Team:  JENNIFER Cintron as PCP - General (Family Medicine)    Chief Complaint:    Chief Complaint   Patient presents with   • Neuro Deficit(s)     RIGHT SIDED FACIAL DROOP, RIGHT SIDED ARM AND LEG WEAKNESS, went to bed around midnight then woke around 1pm with symptoms   • Hypertension     reports bp is normally as high as now       Subjective follow-up refractory hypertension    Interval History:   The patient is feeling somewhat better today, denies any chest pain or shortness of air, no orthopnea or PND, no nausea or vomiting, no dysuria or gross hematuria.  No lower extremity edema.      Review of Systems:   As noted above    Objective     Vital Signs  Temp:  [97.8 °F (36.6 °C)-98.4 °F (36.9 °C)] 98.1 °F (36.7 °C)  Heart Rate:  [57-94] 57  Resp:  [16] 16  BP: (151-216)/() 154/60    Flowsheet Rows         First Filed Value    Admission Height  152.4 cm (60\") Documented at 01/03/2018 1802    Admission Weight  77.1 kg (170 lb) Documented at 01/03/2018 1802             I/O last 3 completed shifts:  In: 1000 [I.V.:1000]  Out: -   No intake or output data in the 24 hours ending 01/06/18 1510    Physical Exam:  General Appearance: alert, oriented x 3, no acute distress,   Skin: warm and dry  HEENT: Nonicteric sclerae, oral mucosa normal,   Neck: supple, no JVD, trachea midline  Lungs: CTA, unlabored breathing effort  Heart: RRR, normal S1 and S2, positive S4, no rub  Abdomen: soft, non-tender,  present bowel sounds to auscultation  : no palpable bladder,  Extremities: no edema, cyanosis or clubbing  Neuro: normal speech and mental status      Results Review:      Results from last 7 days  Lab Units 01/04/18  0442 01/03/18  1845   SODIUM mmol/L 141 144   POTASSIUM mmol/L 3.5 3.1*   CHLORIDE mmol/L 101 103   CO2 mmol/L 30.2* 28.6   BUN mg/dL 8 8   CREATININE mg/dL 0.65 0.73   CALCIUM mg/dL 9.0 9.4   BILIRUBIN mg/dL 0.6 0.6   ALK PHOS U/L 74 79   ALT (SGPT) U/L 24 27   AST (SGOT) " U/L 10 14   GLUCOSE mg/dL 97 110*       Estimated Creatinine Clearance: 64.3 mL/min (by C-G formula based on Cr of 0.65).      Results from last 7 days  Lab Units 01/06/18  0647 01/04/18  0442   MAGNESIUM mg/dL 2.4 2.5*               Results from last 7 days  Lab Units 01/04/18  0442 01/03/18  1845   WBC 10*3/mm3 9.78 11.76*   HEMOGLOBIN g/dL 14.7 15.2   PLATELETS 10*3/mm3 281 298         Results from last 7 days  Lab Units 01/04/18  0755 01/03/18  1845   INR  1.08 0.95         Imaging Results (last 24 hours)     ** No results found for the last 24 hours. **          aspirin 325 mg Oral Daily   Or      aspirin 300 mg Rectal Daily   CloNIDine 0.2 mg Oral Q8H   clopidogrel 75 mg Oral Daily   CoQ-10 200 mg Oral Daily   losartan 100 mg Oral Q24H   And      hydrochlorothiazide 25 mg Oral Daily   rosuvastatin 10 mg Oral Nightly   spironolactone 100 mg Oral Daily       Pharmacy Consult - Pharmacy to dose        Medication Review:   Current Facility-Administered Medications   Medication Dose Route Frequency Provider Last Rate Last Dose   • aluminum-magnesium hydroxide-simethicone (MAALOX MAX) 400-400-40 MG/5ML suspension 7.5 mL  7.5 mL Oral Q4H PRN Mark Graham MD       • aspirin tablet 325 mg  325 mg Oral Daily Mark Graham MD   325 mg at 01/06/18 1030    Or   • aspirin suppository 300 mg  300 mg Rectal Daily Makr Graham MD       • bisacodyl (DULCOLAX) suppository 10 mg  10 mg Rectal Daily PRN Mark Graham MD       • CloNIDine (CATAPRES) tablet 0.2 mg  0.2 mg Oral Q8H Gaby Becerril MD   0.2 mg at 01/06/18 1354   • clopidogrel (PLAVIX) tablet 75 mg  75 mg Oral Daily Richard Stark MD   75 mg at 01/06/18 1030   • CoQ-10 capsule 200 mg  200 mg Oral Daily Jerry Alonzo MD   200 mg at 01/06/18 1032   • docusate sodium (COLACE) capsule 100 mg  100 mg Oral BID PRN Mark Graham MD       • losartan (COZAAR) tablet 100 mg  100 mg Oral Q24H Vinicio Ruiz MD   100 mg at 01/06/18 1031    And    • hydrochlorothiazide (HYDRODIURIL) tablet 25 mg  25 mg Oral Daily Vinicio Ruiz MD   25 mg at 01/06/18 1031   • labetalol (NORMODYNE,TRANDATE) injection 20 mg  20 mg Intravenous Q6H PRN Mark Graham MD       • ondansetron (ZOFRAN) injection 4 mg  4 mg Intravenous Q6H PRN Mark Graham MD       • Pharmacy Consult - Pharmacy to dose   Does not apply Continuous PRN Nubiayohan Szymanski, APRN       • potassium chloride (MICRO-K) CR capsule 40 mEq  40 mEq Oral PRN Mark Graham MD        Or   • potassium chloride (KLOR-CON) packet 40 mEq  40 mEq Oral PRN Mark Graham MD        Or   • potassium chloride 10 mEq in 100 mL IVPB  10 mEq Intravenous Q1H PRN Mark Graham MD       • rosuvastatin (CRESTOR) tablet 10 mg  10 mg Oral Nightly Nubia KRYSTIAN Szymanski, APRN   10 mg at 01/05/18 2147   • sodium chloride 0.9 % flush 1-10 mL  1-10 mL Intravenous PRN Mark Graham MD       • sodium chloride 0.9 % flush 10 mL  10 mL Intravenous PRN Marco Antonio Armas MD       • spironolactone (ALDACTONE) tablet 100 mg  100 mg Oral Daily Gaby Becerril MD   100 mg at 01/06/18 1030       Assessment/Plan   1.  Refractory hypertension requiring multiple medication, has improved with the addition of the spironolactone the current regimen, the workup this in progress.  2.  Hypokalemia improved in association with hypertension could be related to the hydrochlorothiazide but also having adrenal etiologies of the need to be ruled out.  3.  Acute CVA,  appears to be stable  4.  Dyslipidemia      Plan:  1.  We'll continue the same treatment.  2.  We'll check orthostatic blood pressures  3.  Surveillance labs          Osmin Bear MD  01/06/18  3:10 PM

## 2018-01-06 NOTE — PROGRESS NOTES
[facility]     LOS: 3 days     Subjective:  No cp  No soa  Facial droop just a little bit  Right sided weakness better 80%  Eating some    Objective:    Vital Signs:  Temp:  [97.8 °F (36.6 °C)-98.4 °F (36.9 °C)] 98.1 °F (36.7 °C)  Heart Rate:  [57-94] 57  Resp:  [16] 16  BP: (151-216)/() 154/60    General Appearance: no acute distress, appears comfortable  Heart: regular rate and rhythm  Lungs: clear to auscultation bilaterally, respirations unlabored, good air entry  Abdomen: soft, non-tender, no guarding, no rebound, non-distended  Extremities: no edema, no cyanosis  Neurology: speech normal, CN grossly normal  Psychiatric: normal mood and affect    Results Review:    Glucose   Date Value Ref Range Status   01/04/2018 97 65 - 99 mg/dL Final   01/03/2018 110 (H) 65 - 99 mg/dL Final       Results from last 7 days  Lab Units 01/04/18  0442   WBC 10*3/mm3 9.78   HEMOGLOBIN g/dL 14.7   HEMATOCRIT % 45.8*   PLATELETS 10*3/mm3 281       Results from last 7 days  Lab Units 01/04/18  0442   SODIUM mmol/L 141   POTASSIUM mmol/L 3.5   CHLORIDE mmol/L 101   CO2 mmol/L 30.2*   BUN mg/dL 8   CREATININE mg/dL 0.65   CALCIUM mg/dL 9.0   BILIRUBIN mg/dL 0.6   ALK PHOS U/L 74   ALT (SGPT) U/L 24   AST (SGOT) U/L 10   GLUCOSE mg/dL 97       Results from last 7 days  Lab Units 01/04/18  0755   INR  1.08       Results from last 7 days  Lab Units 01/06/18  0647   MAGNESIUM mg/dL 2.4         Cultures:       I have reviewed daily medications and changes in CPOE    Scheduled meds    aspirin 325 mg Oral Daily   Or      aspirin 300 mg Rectal Daily   CloNIDine 0.2 mg Oral Q8H   clopidogrel 75 mg Oral Daily   CoQ-10 200 mg Oral Daily   losartan 100 mg Oral Q24H   And      hydrochlorothiazide 25 mg Oral Daily   rosuvastatin 10 mg Oral Nightly   spironolactone 100 mg Oral Daily         Pharmacy Consult - Pharmacy to dose      PRN meds  •  aluminum-magnesium hydroxide-simethicone  •  bisacodyl  •  docusate  sodium  •  labetalol  •  ondansetron  •  Pharmacy Consult - Pharmacy to dose  •  potassium chloride **OR** potassium chloride **OR** potassium chloride  •  sodium chloride  •  sodium chloride      Principal Problem:    Acute CVA (cerebrovascular accident)  Active Problems:    HTN (hypertension)    Dyslipidemia    Intracranial atherosclerosis        Assessment/Plan:      Acute CVA (cerebrovascular accident)-apirin and plavix      HTN (hypertension)-losartan and HCTZ  -clonidine  -spironolactone  -aldosterone/renin levels  -renal dopplers negative      Dyslipidemia-coenzyme q and crestor      Intracranial atherosclerosis      Vincent Hanson MD  01/06/18  2:38 PM

## 2018-01-06 NOTE — PROGRESS NOTES
"DOS: 2018  NAME: Roberta Houston   : 1950  PCP: JENNIFER Cintron  Chief Complaint   Patient presents with   • Neuro Deficit(s)     RIGHT SIDED FACIAL DROOP, RIGHT SIDED ARM AND LEG WEAKNESS, went to bed around midnight then woke around 1pm with symptoms   • Hypertension     reports bp is normally as high as now     CC: Stroke    Subjective: No new events overnight per RN. Patient denies headache or any new stroke/TIA symptoms. She is OOB in chair. BP better controlled.      Interval History  History taken from: patient chart family RN    Objective:  Vital signs: /91 (BP Location: Left arm, Patient Position: Sitting)  Pulse 94  Temp 98 °F (36.7 °C) (Oral)   Resp 16  Ht 152.4 cm (60\")  Wt 77.1 kg (170 lb)  SpO2 92%  BMI 33.2 kg/m2      Physical Exam:  GENERAL: NAD  HEENT: Normocephalic, atraumatic   COR: RRR  Resp: Even and unlabored  Extremities: No signs of distal embolization.     Neurological:   MS: AO. Language normal. No neglect. Higher integrative function normal  CN: II-XII normal with mild right facial droop, mild word finding difficulty.  Motor: Normal strength and tone except for subtle right pronator drift  Sensory: Intact  Coordination: Normal    Results Review:     I reviewed the patient's new clinical results.    Current Medications:    aspirin 325 mg Oral Daily   Or      aspirin 300 mg Rectal Daily   CloNIDine 0.2 mg Oral Q8H   clopidogrel 75 mg Oral Daily   CoQ-10 200 mg Oral Daily   losartan 100 mg Oral Q24H   And      hydrochlorothiazide 25 mg Oral Daily   rosuvastatin 10 mg Oral Nightly   spironolactone 100 mg Oral Daily       Pharmacy Consult - Pharmacy to dose        Medications Reviewed    Laboratory results:  Lab Results   Component Value Date    GLUCOSE 97 2018    CALCIUM 9.0 2018     2018    K 3.5 2018    CO2 30.2 (H) 2018     2018    BUN 8 2018    CREATININE 0.65 2018    EGFRIFNONA 91 2018    BCR 12.3 " 01/04/2018    ANIONGAP 9.8 01/04/2018     Lab Results   Component Value Date    WBC 9.78 01/04/2018    HGB 14.7 01/04/2018    HCT 45.8 (H) 01/04/2018    MCV 93.7 01/04/2018     01/04/2018        Results from last 7 days  Lab Units 01/04/18  0442   CHOLESTEROL mg/dL 213*     Lab Results   Component Value Date    INR 1.08 01/04/2018    INR 0.95 01/03/2018    PROTIME 13.6 01/04/2018    PROTIME 12.3 01/03/2018     Lab Results   Component Value Date    TSH 2.270 01/04/2018     Lab Results   Component Value Date    LDLCALC 139 (H) 01/04/2018     Lab Results   Component Value Date    HGBA1C 5.60 01/04/2018     Lab Results   Component Value Date    ZFMCJHXU28 450 01/05/2018     Visible to patient:  No (Not Released)      Ref Range & Units 2d ago     Homocystine, Plasma (Quant) 0.0 - 15.0 umol/L 10.6                  Ref Range & Units 1d ago     P2Y12 Platelet Inhibition 194 - 418  (L)     No components found for: B12    Review and interpretation of imaging: Per Dr. Stark,  CT brain 1/3/18: No acute stroke mass or hemorrhage, there are multiple areas of encephalomalacia in the left MCA NAFISA border zone with some areas involving the cortex, is an old stroke in the left head of the caudate, old strokes in the right centrum semiovale ovale, is calcification of the intracranial vertebral arteries  CT Brain 12/22/17: Similar to the more recent scan but the left frontal white matter lesion appears to be new  MRI brain 1/4/18: Multiple acute strokes in the left MCA territory as well as a left MCA NAFISA border zone, flair shows moderate to mild periventricular white matter disease, SWI sequences are negative postcontrast images negative MRA of the aortic arch is poor quality due to patient motion, both internal carotid arteries are patent although severely tortuous with some areas of dilation particularly distally in the cervical ICAs right worst than left suggestive of fibromuscular dysplasia, the right vertebral  artery is patent but the left is absent, MRA [place] shows a severe flow-limiting stenosis of the distal left middle cerebral artery, is also severe stenosis of the mid-basilar artery, there are diffuse irregularities of the anterior cerebral arteries distally    TTE 1/4/18: Interpretation Summary   · The left ventricular cavity is borderline dilated.  · Left ventricular systolic function is normal. Calculated EF = 60.2%.  · Left ventricular wall thickness is consistent with mild-to-moderate concentric hypertrophy.  · Left ventricular diastolic dysfunction is noted (grade II w/high LAP) consistent with pseudonormalization.  · Normal right ventricular cavity size and systolic function noted  · Left atrial cavity size is mildly dilated.  · There is mild aortic stenosis with a peak gradient of 23 mm Hg, and a mean gradient of 12 mm Hg. .  · Calculated right ventricular systolic pressure from tricuspid regurgitation is 19 mmHg.  · There is no evidence of pericardial effusion.     Impression: Ms. Houston is a  68 yo RHW female with  uncontrolled HTN and HLD who presented to the ED on 1/3/18 with right sided weakness and aphasia. I discussed her imaging with Dr. Stark which confirms multiple strokes in left MCA cortex as well as the left MCA/NAFISA border zone.  MRA shows multiple intracranial stenoses particularly in the left MCA as well as the basilar artery.  The prognosis for neurological recovery is good.  The strokes are most likely due to intracranial atherosclerosis due to chronically untreated hypertension as well as hyperlipidemia.  CNS vasculitis is unlikely as she does not have headaches.  The left MCA stenosis is flow-limiting and she is at a high risk of major stroke. Her TCDs     She has been intolerant of statins in the past. Enzyme Co-Q10 was started and plans are to gradually increase crestor, currently on 10 mg. I discussed the importance of cholesterol control for stroke prevention at length  with patient and she voiced understanding. Okay to continue gradual increase in activity and normalization of BP. Continue ASA, Plavix, Crestor. CCP to assist with discharge planning. PT/OT/ST.     Plan:  Aspirin 325mg  Plavix 300 mg - done  Plavix 75mg daily  Hydrate  Neurochecks  Mobilize slowly and monitor for worsening of symptoms  Crestor 10mg along with CoQ10 and titrate up to 40 mg as soon as possible  Non-pharmacological DVT prophylaxis  EKG Tele  PT/OT/ST  Stroke Education  Goal LDL <70-recommend high dose statins-                       Serum glucose < 140  Once discharged Off work for 2 weeks; stay well hydrated and rest     I have discussed the above with Dr. Stark, RN, and patient.  Kari Davison, APRN  01/06/18  1:55 PM      Principal Problem:    Acute CVA (cerebrovascular accident)  Active Problems:    HTN (hypertension)    Dyslipidemia    Intracranial atherosclerosis

## 2018-01-06 NOTE — PLAN OF CARE
Problem: Patient Care Overview (Adult)  Goal: Plan of Care Review  Outcome: Ongoing (interventions implemented as appropriate)   01/06/18 1755   Coping/Psychosocial Response Interventions   Plan Of Care Reviewed With patient   Patient Care Overview   Progress no change   Outcome Evaluation   Outcome Summary/Follow up Plan NIH 1. LOCX4. vital signs stable. Renal ultrasound completed this shift. Orthostatic BP ordered BID.        Problem: Stroke (Ischemic) (Adult)  Goal: Signs and Symptoms of Listed Potential Problems Will be Absent or Manageable (Stroke)  Outcome: Ongoing (interventions implemented as appropriate)   01/05/18 1757   Stroke (Ischemic)   Problems Assessed (Stroke (Ischemic)/TIA) all   Problems Present (Stroke (Ischemic)/TIA) communication impairment

## 2018-01-07 LAB
ALBUMIN SERPL-MCNC: 4.1 G/DL (ref 3.5–5.2)
ANION GAP SERPL CALCULATED.3IONS-SCNC: 12.7 MMOL/L
BUN BLD-MCNC: 19 MG/DL (ref 8–23)
BUN/CREAT SERPL: 20.9 (ref 7–25)
CALCIUM SPEC-SCNC: 9.5 MG/DL (ref 8.6–10.5)
CHLORIDE SERPL-SCNC: 100 MMOL/L (ref 98–107)
CO2 SERPL-SCNC: 25.3 MMOL/L (ref 22–29)
CREAT BLD-MCNC: 0.91 MG/DL (ref 0.57–1)
GFR SERPL CREATININE-BSD FRML MDRD: 62 ML/MIN/1.73
GLUCOSE BLD-MCNC: 90 MG/DL (ref 65–99)
MAGNESIUM SERPL-MCNC: 2.4 MG/DL (ref 1.6–2.4)
PHOSPHATE SERPL-MCNC: 4.2 MG/DL (ref 2.5–4.5)
POTASSIUM BLD-SCNC: 3.8 MMOL/L (ref 3.5–5.2)
SODIUM BLD-SCNC: 138 MMOL/L (ref 136–145)
URATE SERPL-MCNC: 8.7 MG/DL (ref 2.4–5.7)

## 2018-01-07 PROCEDURE — 83735 ASSAY OF MAGNESIUM: CPT | Performed by: INTERNAL MEDICINE

## 2018-01-07 PROCEDURE — 84550 ASSAY OF BLOOD/URIC ACID: CPT | Performed by: INTERNAL MEDICINE

## 2018-01-07 PROCEDURE — 80069 RENAL FUNCTION PANEL: CPT | Performed by: INTERNAL MEDICINE

## 2018-01-07 PROCEDURE — 99231 SBSQ HOSP IP/OBS SF/LOW 25: CPT | Performed by: NURSE PRACTITIONER

## 2018-01-07 RX ORDER — ROSUVASTATIN CALCIUM 20 MG/1
20 TABLET, COATED ORAL NIGHTLY
Status: DISCONTINUED | OUTPATIENT
Start: 2018-01-07 | End: 2018-01-08 | Stop reason: HOSPADM

## 2018-01-07 RX ADMIN — CLOPIDOGREL 75 MG: 75 TABLET, FILM COATED ORAL at 08:48

## 2018-01-07 RX ADMIN — ASPIRIN 325 MG: 325 TABLET ORAL at 08:48

## 2018-01-07 RX ADMIN — SPIRONOLACTONE 100 MG: 100 TABLET, FILM COATED ORAL at 08:48

## 2018-01-07 RX ADMIN — LOSARTAN POTASSIUM 100 MG: 100 TABLET ORAL at 08:48

## 2018-01-07 RX ADMIN — ROSUVASTATIN CALCIUM 20 MG: 20 TABLET, FILM COATED ORAL at 22:28

## 2018-01-07 RX ADMIN — Medication 200 MG: at 08:49

## 2018-01-07 RX ADMIN — CLONIDINE HYDROCHLORIDE 0.2 MG: 0.1 TABLET ORAL at 22:28

## 2018-01-07 RX ADMIN — CLONIDINE HYDROCHLORIDE 0.2 MG: 0.1 TABLET ORAL at 14:24

## 2018-01-07 RX ADMIN — CLONIDINE HYDROCHLORIDE 0.2 MG: 0.1 TABLET ORAL at 06:37

## 2018-01-07 RX ADMIN — HYDROCHLOROTHIAZIDE 25 MG: 25 TABLET ORAL at 08:48

## 2018-01-07 NOTE — PROGRESS NOTES
LOS: 4 days     Name: Roberta Houston  Age/Sex: 67 y.o. female  :  1950        PCP: JENNIFER Cintron    Subjective   She's feeling better denies any new neurologic symptoms.  She hasn't yet worked with physical therapy but denies really any need at the present time.  General: No Fever or Chills, Cardiac: No Chest Pain or Palpitations, Resp: No Cough or SOA, GI: No Nausea, Vomiting, or Diarrhea and Other: No bleeding      aspirin 325 mg Oral Daily   Or      aspirin 300 mg Rectal Daily   CloNIDine 0.2 mg Oral Q8H   clopidogrel 75 mg Oral Daily   CoQ-10 200 mg Oral Daily   losartan 100 mg Oral Q24H   And      hydrochlorothiazide 25 mg Oral Daily   rosuvastatin 10 mg Oral Nightly   spironolactone 100 mg Oral Daily       Pharmacy Consult - Pharmacy to dose        Objective   Vital Signs  Temp:  [97.8 °F (36.6 °C)-98.1 °F (36.7 °C)] 98 °F (36.7 °C)  Heart Rate:  [51-94] 62  Resp:  [16] 16  BP: (146-164)/(43-91) 164/74  Body mass index is 33.2 kg/(m^2).  No intake or output data in the 24 hours ending 18 0808    Physical Exam   Constitutional: She is oriented to person, place, and time. She appears well-developed and well-nourished. No distress.   HENT:   Head: Atraumatic.   Nose: Nose normal.   Mouth/Throat: No oropharyngeal exudate.   Eyes: EOM are normal. No scleral icterus.   Neck: Neck supple. No JVD present.   Cardiovascular: Normal rate, regular rhythm and normal heart sounds.    Pulmonary/Chest: Effort normal and breath sounds normal. No respiratory distress.   Abdominal: Soft. Bowel sounds are normal. She exhibits no distension.   Musculoskeletal: Normal range of motion. She exhibits no edema.   Neurological: She is alert and oriented to person, place, and time.   Skin: Skin is dry.   Psychiatric: She has a normal mood and affect. Her behavior is normal.   Nursing note and vitals reviewed.        Results Review:       I reviewed the patient's new clinical results.    Results from last 7  days  Lab Units 01/04/18  0442 01/03/18  1845   WBC 10*3/mm3 9.78 11.76*   HEMOGLOBIN g/dL 14.7 15.2   PLATELETS 10*3/mm3 281 298     Results from last 7 days  Lab Units 01/07/18  0506 01/06/18  0647 01/04/18  0442 01/03/18  1845   SODIUM mmol/L 138  --  141 144   POTASSIUM mmol/L 3.8  --  3.5 3.1*   CHLORIDE mmol/L 100  --  101 103   CO2 mmol/L 25.3  --  30.2* 28.6   BUN mg/dL 19  --  8 8   CREATININE mg/dL 0.91  --  0.65 0.73   CALCIUM mg/dL 9.5  --  9.0 9.4   MAGNESIUM mg/dL 2.4 2.4 2.5*  --    PHOSPHORUS mg/dL 4.2  --   --   --    Estimated Creatinine Clearance: 56.5 mL/min (by C-G formula based on Cr of 0.91).    Results from last 7 days  Lab Units 01/04/18  0442   CHOLESTEROL mg/dL 213*   TRIGLYCERIDES mg/dL 198*   HDL CHOL mg/dL 34*   HEMOGLOBIN A1C % 5.60   LDL CHOL mg/dL 139*       Assessment/Plan   Principal Problem:    Acute CVA (cerebrovascular accident)  Active Problems:    HTN (hypertension)    Dyslipidemia    Intracranial atherosclerosis      PLAN  - Continue present management.    - Await PT eval  - Hopefully able to discharge tomorrow.      Disposition        Vincent [place] MD  [facility]  01/07/18  8:08 AM

## 2018-01-07 NOTE — PROGRESS NOTES
"DOS: 2018  NAME: Roberta Houston   : 1950  PCP: JENNIFER Cintron  Chief Complaint   Patient presents with   • Neuro Deficit(s)     RIGHT SIDED FACIAL DROOP, RIGHT SIDED ARM AND LEG WEAKNESS, went to bed around midnight then woke around 1pm with symptoms   • Hypertension     reports bp is normally as high as now     CC: Stroke    Subjective: No new events overnight per RN. Patient denies headache or any new stroke/TIA symptoms. She does c/o of residual word-finding difficulty and feeling \"wobbly\" at times. She has been up ambulating with no worsening or new neurologic symptoms.     Interval History  History taken from: patient chart RN    Objective:  Vital signs: /63 (BP Location: Right arm, Patient Position: Lying)  Pulse 54  Temp 98.1 °F (36.7 °C) (Oral)   Resp 16  Ht 152.4 cm (60\")  Wt 77.1 kg (170 lb)  SpO2 99%  BMI 33.2 kg/m2      Physical Exam:  GENERAL: NAD  HEENT: Normocephalic, atraumatic   COR: RRR  Resp: Even and unlabored  Extremities: No signs of distal embolization.    Neurological:   MS: AO. Spontaneous speech nearly normal. Mild word-finding difficulty. Naming, repetition and reading intact.  No neglect. Higher integrative function normal  CN: II-XII normal except for mild right facial droop  Motor: Normal strength and tone throughout except for subtle right pronator drift.   Sensory: Intact  Coordination: Normal    Results Review:     I reviewed the patient's new clinical results.    Current Medications:    aspirin 325 mg Oral Daily   Or      aspirin 300 mg Rectal Daily   CloNIDine 0.2 mg Oral Q8H   clopidogrel 75 mg Oral Daily   CoQ-10 200 mg Oral Daily   losartan 100 mg Oral Q24H   And      hydrochlorothiazide 25 mg Oral Daily   rosuvastatin 10 mg Oral Nightly   spironolactone 100 mg Oral Daily       Pharmacy Consult - Pharmacy to dose        Medications Reviewed: Changes made    Laboratory results:  Lab Results   Component Value Date    GLUCOSE 90 2018    CALCIUM 9.5 " 01/07/2018     01/07/2018    K 3.8 01/07/2018    CO2 25.3 01/07/2018     01/07/2018    BUN 19 01/07/2018    CREATININE 0.91 01/07/2018    EGFRIFNONA 62 01/07/2018    BCR 20.9 01/07/2018    ANIONGAP 12.7 01/07/2018     Lab Results   Component Value Date    WBC 9.78 01/04/2018    HGB 14.7 01/04/2018    HCT 45.8 (H) 01/04/2018    MCV 93.7 01/04/2018     01/04/2018        Results from last 7 days  Lab Units 01/04/18  0442   CHOLESTEROL mg/dL 213*     Lab Results   Component Value Date    INR 1.08 01/04/2018    INR 0.95 01/03/2018    PROTIME 13.6 01/04/2018    PROTIME 12.3 01/03/2018     Lab Results   Component Value Date    TSH 2.270 01/04/2018     Lab Results   Component Value Date    LDLCALC 139 (H) 01/04/2018     Lab Results   Component Value Date    HGBA1C 5.60 01/04/2018     No components found for: B12    Ref Range & Units 2d ago    Homocystine, Plasma (Quant) 0.0 - 15.0 umol/L 10.6                     Ref Range & Units 1d ago    P2Y12 Platelet Inhibition 194 - 418  (       Review and interpretation of imaging: Per Dr. Stark,  CT brain 1/3/18: No acute stroke mass or hemorrhage, there are multiple areas of encephalomalacia in the left MCA NAFISA border zone with some areas involving the cortex, is an old stroke in the left head of the caudate, old strokes in the right centrum semiovale ovale, is calcification of the intracranial vertebral arteries  CT Brain 12/22/17: Similar to the more recent scan but the left frontal white matter lesion appears to be new  MRI brain 1/4/18: Multiple acute strokes in the left MCA territory as well as a left MCA NAFISA border zone, flair shows moderate to mild periventricular white matter disease, SWI sequences are negative postcontrast images negative MRA of the aortic arch is poor quality due to patient motion, both internal carotid arteries are patent although severely tortuous with some areas of dilation particularly distally in the cervical ICAs right  worst than left suggestive of fibromuscular dysplasia, the right vertebral artery is patent but the left is absent, MRA [place] shows a severe flow-limiting stenosis of the distal left middle cerebral artery, is also severe stenosis of the mid-basilar artery, there are diffuse irregularities of the anterior cerebral arteries distally     TTE 1/4/18: Interpretation Summary   · The left ventricular cavity is borderline dilated.  · Left ventricular systolic function is normal. Calculated EF = 60.2%.  · Left ventricular wall thickness is consistent with mild-to-moderate concentric hypertrophy.  · Left ventricular diastolic dysfunction is noted (grade II w/high LAP) consistent with pseudonormalization.  · Normal right ventricular cavity size and systolic function noted  · Left atrial cavity size is mildly dilated.  · There is mild aortic stenosis with a peak gradient of 23 mm Hg, and a mean gradient of 12 mm Hg. .  · Calculated right ventricular systolic pressure from tricuspid regurgitation is 19 mmHg.  · There is no evidence of pericardial effusion.     Impression: Ms. oHuston is a  66 yo RHW female with  uncontrolled HTN and HLD who presented to the ED on 1/3/18 with right sided weakness and aphasia. Her imagingconfirms multiple strokes in left MCA cortex as well as the left MCA/NAFISA border zone.  MRA shows multiple intracranial stenoses particularly in the left MCA as well as the basilar artery.  The prognosis for neurological recovery is good.  The strokes are most likely due to intracranial atherosclerosis due to chronically untreated hypertension as well as hyperlipidemia.  CNS vasculitis is unlikely as she does not have headaches. The left MCA stenosis is flow-limiting and she is at a high risk of major stroke. Her TCDs were nondiagnostic.      She has been intolerant of statins in the past. Enzyme Co-Q10 was started and plans are to gradually increase crestor, will titrate up to 20 mg. I discussed the  importance of cholesterol control for stroke prevention at length with patient and she voiced understanding. She has tolerated mobilization. Okay to continue increase in activity and normalization of BP. Continue ASA, Plavix, Crestor 20 mg and Co-Q10. Plans are to D/C home. No further neurologic work-up at this time. She is to follow-up in stroke clinic in 3 months. Please call with questions or concerns.      Plan:  Aspirin 325mg  Plavix 300 mg - done  Plavix 75mg daily  Hydrate  Neurochecks  Mobilize slowly and monitor for worsening of symptoms   Increase Crestor to 20mg along with CoQ10 and titrate up to 40 mg as soon as possible  Non-pharmacological DVT prophylaxis  EKG Tele  PT/OT/ST  Stroke Education  Goal LDL <70-recommend high dose statins-                       Serum glucose < 140  Once discharged Off work for 2 weeks; stay well hydrated and rest     Call [phone] for stroke/TIA symptoms  F/U in stroke clinic in 3 months, call [phone] for an appointment    I have discussed the above with the patient and family.  Kari Davison, JENNIFER  01/07/18  3:25 PM      Principal Problem:    Acute CVA (cerebrovascular accident)  Active Problems:    HTN (hypertension)    Dyslipidemia    Intracranial atherosclerosis

## 2018-01-07 NOTE — PLAN OF CARE
Problem: Patient Care Overview (Adult)  Goal: Plan of Care Review  Outcome: Ongoing (interventions implemented as appropriate)   01/07/18 1606   Coping/Psychosocial Response Interventions   Plan Of Care Reviewed With patient   Patient Care Overview   Progress improving   Outcome Evaluation   Outcome Summary/Follow up Plan Pt NIH remains 0. Probable DC home tomorrow when all agree. Up in chair most of day. Will continue to monitor. BP improving today.

## 2018-01-07 NOTE — PROGRESS NOTES
"   LOS: 4 days    Patient Care Team:  JENNIFER Cintron as PCP - General (Family Medicine)    Chief Complaint:    Chief Complaint   Patient presents with   • Neuro Deficit(s)     RIGHT SIDED FACIAL DROOP, RIGHT SIDED ARM AND LEG WEAKNESS, went to bed around midnight then woke around 1pm with symptoms   • Hypertension     reports bp is normally as high as now       Subjective follow-up refractory hypertension    Interval History:   The patient is feeling somewhat better today, denies any chest pain or shortness of air, no orthopnea or PND, no nausea or vomiting, no dysuria or gross hematuria.  No lower extremity edema.  Blood pressure has improved    Review of Systems:   As noted above    Objective     Vital Signs  Temp:  [97.8 °F (36.6 °C)-98.1 °F (36.7 °C)] 98 °F (36.7 °C)  Heart Rate:  [51-64] 53  Resp:  [16] 16  BP: (135-164)/(43-95) 135/95    Flowsheet Rows         First Filed Value    Admission Height  152.4 cm (60\") Documented at 01/03/2018 1802    Admission Weight  77.1 kg (170 lb) Documented at 01/03/2018 1802                No intake or output data in the 24 hours ending 01/07/18 1035    Physical Exam:  General Appearance: alert, oriented x 3, no acute distress,   Skin: warm and dry  HEENT: Nonicteric sclerae, oral mucosa normal,   Neck: supple, no JVD, trachea midline  Lungs: CTA, unlabored breathing effort  Heart: RRR, normal S1 and S2, positive S4, no rub  Abdomen: soft, non-tender,  present bowel sounds to auscultation  : no palpable bladder,  Extremities: no edema, cyanosis or clubbing  Neuro: normal speech and mental status      Results Review:      Results from last 7 days  Lab Units 01/07/18  0506 01/04/18  0442 01/03/18  1845   SODIUM mmol/L 138 141 144   POTASSIUM mmol/L 3.8 3.5 3.1*   CHLORIDE mmol/L 100 101 103   CO2 mmol/L 25.3 30.2* 28.6   BUN mg/dL 19 8 8   CREATININE mg/dL 0.91 0.65 0.73   CALCIUM mg/dL 9.5 9.0 9.4   BILIRUBIN mg/dL  --  0.6 0.6   ALK PHOS U/L  --  74 79   ALT (SGPT) U/L  " --  24 27   AST (SGOT) U/L  --  10 14   GLUCOSE mg/dL 90 97 110*       Estimated Creatinine Clearance: 56.5 mL/min (by C-G formula based on Cr of 0.91).      Results from last 7 days  Lab Units 01/07/18  0506 01/06/18  0647 01/04/18  0442   MAGNESIUM mg/dL 2.4 2.4 2.5*   PHOSPHORUS mg/dL 4.2  --   --          Results from last 7 days  Lab Units 01/07/18  0506   URIC ACID mg/dL 8.7*         Results from last 7 days  Lab Units 01/04/18  0442 01/03/18  1845   WBC 10*3/mm3 9.78 11.76*   HEMOGLOBIN g/dL 14.7 15.2   PLATELETS 10*3/mm3 281 298         Results from last 7 days  Lab Units 01/04/18  0755 01/03/18  1845   INR  1.08 0.95         Imaging Results (last 24 hours)     ** No results found for the last 24 hours. **          aspirin 325 mg Oral Daily   Or      aspirin 300 mg Rectal Daily   CloNIDine 0.2 mg Oral Q8H   clopidogrel 75 mg Oral Daily   CoQ-10 200 mg Oral Daily   losartan 100 mg Oral Q24H   And      hydrochlorothiazide 25 mg Oral Daily   rosuvastatin 10 mg Oral Nightly   spironolactone 100 mg Oral Daily       Pharmacy Consult - Pharmacy to dose        Medication Review:   Current Facility-Administered Medications   Medication Dose Route Frequency Provider Last Rate Last Dose   • aluminum-magnesium hydroxide-simethicone (MAALOX MAX) 400-400-40 MG/5ML suspension 7.5 mL  7.5 mL Oral Q4H PRN Mark Graham MD       • aspirin tablet 325 mg  325 mg Oral Daily Mark Graham MD   325 mg at 01/07/18 0848    Or   • aspirin suppository 300 mg  300 mg Rectal Daily Mark Graham MD       • bisacodyl (DULCOLAX) suppository 10 mg  10 mg Rectal Daily PRN Mark Graham MD       • CloNIDine (CATAPRES) tablet 0.2 mg  0.2 mg Oral Q8H Gaby Becerril MD   0.2 mg at 01/07/18 0637   • clopidogrel (PLAVIX) tablet 75 mg  75 mg Oral Daily Richard Stark MD   75 mg at 01/07/18 0848   • CoQ-10 capsule 200 mg  200 mg Oral Daily Jerry Alonzo MD   200 mg at 01/07/18 0849   • docusate sodium (COLACE)  capsule 100 mg  100 mg Oral BID PRN Mark Graham MD       • losartan (COZAAR) tablet 100 mg  100 mg Oral Q24H Vinicio Ruiz MD   100 mg at 01/07/18 0848    And   • hydrochlorothiazide (HYDRODIURIL) tablet 25 mg  25 mg Oral Daily Vinicio Ruiz MD   25 mg at 01/07/18 0848   • labetalol (NORMODYNE,TRANDATE) injection 20 mg  20 mg Intravenous Q6H PRN Mark Graham MD       • ondansetron (ZOFRAN) injection 4 mg  4 mg Intravenous Q6H PRN Mark Graham MD       • Pharmacy Consult - Pharmacy to dose   Does not apply Continuous PRN Nubia Szymanski APRN       • potassium chloride (MICRO-K) CR capsule 40 mEq  40 mEq Oral PRN Mark Graham MD        Or   • potassium chloride (KLOR-CON) packet 40 mEq  40 mEq Oral PRN Mark Graham MD        Or   • potassium chloride 10 mEq in 100 mL IVPB  10 mEq Intravenous Q1H PRN Mark Graham MD       • rosuvastatin (CRESTOR) tablet 10 mg  10 mg Oral Nightly Nubia KRYSTIAN Szymanski, APRN   10 mg at 01/06/18 2145   • sodium chloride 0.9 % flush 1-10 mL  1-10 mL Intravenous PRN Mark Graham MD       • sodium chloride 0.9 % flush 10 mL  10 mL Intravenous PRN Marco Antonio Armas MD       • spironolactone (ALDACTONE) tablet 100 mg  100 mg Oral Daily Gaby Becerril MD   100 mg at 01/07/18 0848       Assessment/Plan    1. Increased creatinine probably mild [place] eye associated with the improvement of the hypertension.  We'll continue to monitor closely.  2.  Refractory hypertension requiring multiple medication, has improved with the addition of the spironolactone and the current regimen, the workup this in progress.  No orthostatic changes were noted.  3.  Hypokalemia improved in association with hypertension could be related to the hydrochlorothiazide but also having adrenal etiologies of the need to be ruled out.  Potassium today 3.8  4.  Acute CVA,  appears to be stable  5.  Dyslipidemia      Plan:  1.  We'll continue the same treatment.  2.  Surveillance  labs          Osmin Bear MD  01/07/18  10:35 AM

## 2018-01-08 VITALS
BODY MASS INDEX: 33.38 KG/M2 | TEMPERATURE: 98.1 F | SYSTOLIC BLOOD PRESSURE: 164 MMHG | WEIGHT: 170 LBS | OXYGEN SATURATION: 92 % | HEIGHT: 60 IN | DIASTOLIC BLOOD PRESSURE: 78 MMHG | HEART RATE: 86 BPM | RESPIRATION RATE: 18 BRPM

## 2018-01-08 LAB
ALBUMIN SERPL-MCNC: 4 G/DL (ref 3.5–5.2)
ANION GAP SERPL CALCULATED.3IONS-SCNC: 12.9 MMOL/L
BUN BLD-MCNC: 22 MG/DL (ref 8–23)
BUN/CREAT SERPL: 25 (ref 7–25)
CALCIUM SPEC-SCNC: 9.8 MG/DL (ref 8.6–10.5)
CHLORIDE SERPL-SCNC: 101 MMOL/L (ref 98–107)
CO2 SERPL-SCNC: 26.1 MMOL/L (ref 22–29)
CREAT BLD-MCNC: 0.88 MG/DL (ref 0.57–1)
GFR SERPL CREATININE-BSD FRML MDRD: 64 ML/MIN/1.73
GLUCOSE BLD-MCNC: 92 MG/DL (ref 65–99)
PHOSPHATE SERPL-MCNC: 3.9 MG/DL (ref 2.5–4.5)
POTASSIUM BLD-SCNC: 4.4 MMOL/L (ref 3.5–5.2)
SODIUM BLD-SCNC: 140 MMOL/L (ref 136–145)

## 2018-01-08 PROCEDURE — 80069 RENAL FUNCTION PANEL: CPT | Performed by: INTERNAL MEDICINE

## 2018-01-08 RX ORDER — DILTIAZEM HYDROCHLORIDE 240 MG/1
240 CAPSULE, COATED, EXTENDED RELEASE ORAL
Status: DISCONTINUED | OUTPATIENT
Start: 2018-01-08 | End: 2018-01-08 | Stop reason: HOSPADM

## 2018-01-08 RX ORDER — SPIRONOLACTONE 100 MG/1
100 TABLET, FILM COATED ORAL DAILY
Qty: 30 TABLET | Refills: 1 | Status: SHIPPED | OUTPATIENT
Start: 2018-01-09

## 2018-01-08 RX ORDER — CLOPIDOGREL BISULFATE 75 MG/1
75 TABLET ORAL DAILY
Qty: 30 TABLET | Refills: 1 | Status: SHIPPED | OUTPATIENT
Start: 2018-01-09

## 2018-01-08 RX ORDER — ASPIRIN 325 MG
325 TABLET ORAL DAILY
Qty: 30 TABLET | Refills: 1 | Status: SHIPPED | OUTPATIENT
Start: 2018-01-09

## 2018-01-08 RX ORDER — DILTIAZEM HYDROCHLORIDE 120 MG/1
240 CAPSULE, EXTENDED RELEASE ORAL
Status: DISCONTINUED | OUTPATIENT
Start: 2018-01-08 | End: 2018-01-08 | Stop reason: CLARIF

## 2018-01-08 RX ORDER — ROSUVASTATIN CALCIUM 20 MG/1
20 TABLET, COATED ORAL NIGHTLY
Qty: 30 TABLET | Refills: 1 | Status: SHIPPED | OUTPATIENT
Start: 2018-01-08 | End: 2018-08-23 | Stop reason: ALTCHOICE

## 2018-01-08 RX ADMIN — CLOPIDOGREL 75 MG: 75 TABLET, FILM COATED ORAL at 08:11

## 2018-01-08 RX ADMIN — CLONIDINE HYDROCHLORIDE 0.2 MG: 0.1 TABLET ORAL at 14:11

## 2018-01-08 RX ADMIN — CLONIDINE HYDROCHLORIDE 0.2 MG: 0.1 TABLET ORAL at 05:30

## 2018-01-08 RX ADMIN — HYDROCHLOROTHIAZIDE 25 MG: 25 TABLET ORAL at 08:13

## 2018-01-08 RX ADMIN — Medication 200 MG: at 08:14

## 2018-01-08 RX ADMIN — SPIRONOLACTONE 100 MG: 100 TABLET, FILM COATED ORAL at 08:11

## 2018-01-08 RX ADMIN — ASPIRIN 325 MG: 325 TABLET ORAL at 08:11

## 2018-01-08 RX ADMIN — LOSARTAN POTASSIUM 100 MG: 100 TABLET ORAL at 08:13

## 2018-01-08 NOTE — DISCHARGE INSTRUCTIONS
Patient Risk Factors  *High Blood Pressure  *High Cholesterol  *Hardening of the arteries of the brain.  *History of stroke    Please keep all follow up appointments. Please continue taking all blood pressure medications (Cozaar, Hydrodiuril, Aldatone, Catapres and Cardizem) and high cholesterol medication (Rosuvastatin and Plavix) until further notice from your doctor.     [facility]  [address] [place] [address]   Phone [phone]    Call 911 for stroke/TIA symptoms  F/U in stroke clinic in 3 months, call [phone] for an appointment.    Patient may return to work on January 22,2018.

## 2018-01-08 NOTE — PROGRESS NOTES
Case Management Discharge Note    Final Note: Home; Met w/ patient in room.  Introduced self and explained reason for the visit.  Patient denies any d/c needs.    Discharge Placement     No information found        Other: Other (dgt- pvt car)    Discharge Codes: 01  Discharge to home

## 2018-01-08 NOTE — SIGNIFICANT NOTE
01/08/18 0821   Rehab Treatment   Discipline physical therapist   Rehab Evaluation   Evaluation Not Performed other (see comments)  (Pt previously discharged from PT. She has no new mobility deficits that need to be addressed. Will d/c PT eval order. Pt in agreement )

## 2018-01-08 NOTE — DISCHARGE SUMMARY
Date of Admission: 1/3/2018  Date of Discharge:  1/8/2018    PCP: JENNIFER Cintron      DISCHARGE DIAGNOSIS  Principal Problem:    Acute CVA (cerebrovascular accident)  Active Problems:    HTN (hypertension)    Dyslipidemia    Intracranial atherosclerosis      SECONDARY DIAGNOSES  Past Medical History:   Diagnosis Date   • Hypertension    • TIA (transient ischemic attack)        CONSULTS   Consults     Date and Time Order Name Status Description    1/5/2018 1417 Inpatient Consult to Nephrology Completed     1/4/2018 1325 Inpatient Consult to Endocrinology Completed     1/4/2018 0033 Inpatient Consult to Neurology Completed     1/3/2018 2023 [facility] (on-call MD unless specified) Completed           PROCEDURES PERFORMED  CT brain 1/3/18: No acute stroke mass or hemorrhage, there are multiple areas of encephalomalacia in the left MCA NAFISA border zone with some areas involving the cortex, is an old stroke in the left head of the caudate, old strokes in the right centrum semiovale ovale, is calcification of the intracranial vertebral arteries  CT Brain 12/22/17: Similar to the more recent scan but the left frontal white matter lesion appears to be new  MRI brain 1/4/18: Multiple acute strokes in the left MCA territory as well as a left MCA NAFISA border zone, flair shows moderate to mild periventricular white matter disease, SWI sequences are negative postcontrast images negative MRA of the aortic arch is poor quality due to patient motion, both internal carotid arteries are patent although severely tortuous with some areas of dilation particularly distally in the cervical ICAs right worst than left suggestive of fibromuscular dysplasia, the right vertebral artery is patent but the left is absent, MRA [place] shows a severe flow-limiting stenosis of the distal left middle cerebral artery, is also severe stenosis of the mid-basilar artery, there are diffuse irregularities of the anterior cerebral arteries  "distally    HOSPITAL COURSE  Patient is a 67 y.o. female presented to [facility] complaining of Right-sided facial droop since 1 PM the day prior to admission and, right-sided weakness for the last 3 weeks.  Please see the admitting history and physical for further details.  Her imagingconfirms multiple strokes in left MCA cortex as well as the left MCA/NAFISA border zone.  MRA shows multiple intracranial stenoses particularly in the left MCA as well as the basilar artery.  The prognosis for neurological recovery is good.  The strokes are most likely due to intracranial atherosclerosis due to chronically untreated hypertension as well as hyperlipidemia.  CNS vasculitis is unlikely as she does not have headaches. The left MCA stenosis is flow-limiting and she is at a high risk of major stroke. Her TCDs were nondiagnostic.     She has been intolerant of statins in the past. Enzyme Co-Q10 was started and plans are to gradually increase crestor, will titrate up to 20 mg. I discussed the importance of cholesterol control for stroke prevention at length with patient and she voiced understanding. She has tolerated mobilization. Okay to continue increase in activity and normalization of BP. Continue ASA, Plavix, Crestor 20 mg and Co-Q10. Plans are to D/C home. No further neurologic work-up at this time. She is to follow-up in stroke clinic in 3 months      CONDITION ON DISCHARGE  Stable.      VITAL SIGNS  /93  Pulse 63  Temp 98 °F (36.7 °C) (Oral)   Resp 18  Ht 152.4 cm (60\")  Wt 77.1 kg (170 lb)  SpO2 97%  BMI 33.2 kg/m2  Objective:  General Appearance:  Comfortable.    Vital signs: (most recent): Blood pressure 164/78, pulse 86, temperature 98.1 °F (36.7 °C), temperature source Oral, resp. rate 18, height 152.4 cm (60\"), weight 77.1 kg (170 lb), SpO2 92 %.    Output: Producing urine.    HEENT: Normal HEENT exam.    Lungs:  Normal effort.  Breath sounds clear to auscultation.    Heart: Normal rate. "  S1 normal and S2 normal.    Abdomen: Abdomen is soft.  Bowel sounds are normal.   There is no abdominal tenderness.     Extremities: Normal range of motion.    Pulses: Distal pulses are intact.    Neurological: Patient is alert and oriented to person, place and time.                DISCHARGE DISPOSITION   Home or Self Care      DISCHARGE MEDICATIONS   Roberta Houston   Home Medication Instructions INNA:[id number]    Printed on:01/08/18 8634   Medication Information                      aspirin 325 MG tablet  Take 1 tablet by mouth Daily.             CloNIDine (CATAPRES) 0.2 MG tablet  Take 0.2 mg by mouth 2 (Two) Times a Day.             clopidogrel (PLAVIX) 75 MG tablet  Take 1 tablet by mouth Daily.             Coenzyme Q10 (COQ-10) 100 MG capsule  Take 200 mg by mouth Daily.             diltiaZEM CD (CARDIZEM CD) 240 MG 24 hr capsule  Take 240 mg by mouth Daily.             losartan-hydrochlorothiazide (HYZAAR) 100-25 MG per tablet  Take 1 tablet by mouth Daily.             rosuvastatin (CRESTOR) 20 MG tablet  Take 1 tablet by mouth Every Night.             spironolactone (ALDACTONE) 100 MG tablet  Take 1 tablet by mouth Daily.               Diet Instructions     Diet: Regular, Cardiac; Thin       Discharge Diet:   Regular  Cardiac      Fluid Consistency:  Thin                Activity Instructions     Activity as Tolerated                 No future appointments.  Additional Instructions for the Follow-ups that You Need to Schedule     Discharge Follow-up with PCP    As directed    Follow Up Details:  2 weeks           Discharge Follow-up with Specified Provider: [facility]; 3 Months    As directed    To:  [facility]    Follow Up:  3 Months           Discharge Follow-up with Specified Provider: Dr Bear; 1 Month    As directed    To:  Dr Bear    Follow Up:  1 Month                 Follow-up Information     Follow up with JENNIFER Cintron .    Specialty:  Family Medicine    Why:  2 weeks     Contact information:    [address]  [place] [address]  [phone]            TEST  RESULTS PENDING AT DISCHARGE   Order Current Status    Metanephrines, Frac. Free, Plasma In process    Metanephrines, Pheochromocyt - Urine, Clean Catch In process    Renin Activity & Aldosterone In process             Vincent Martinez MD  [facility]  01/08/18  4:03 PM      Time: greater than 30 minutes.

## 2018-01-08 NOTE — PLAN OF CARE
Problem: Patient Care Overview (Adult)  Goal: Plan of Care Review  Outcome: Ongoing (interventions implemented as appropriate)   01/08/18 0448   Coping/Psychosocial Response Interventions   Plan Of Care Reviewed With patient   Patient Care Overview   Progress improving   Outcome Evaluation   Outcome Summary/Follow up Plan NIH remains 0. D/C to home in AM most likely. vss.        Problem: Stroke (Ischemic) (Adult)  Goal: Signs and Symptoms of Listed Potential Problems Will be Absent or Manageable (Stroke)  Outcome: Ongoing (interventions implemented as appropriate)

## 2018-01-08 NOTE — PROGRESS NOTES
"   LOS: 5 days    Patient Care Team:  JENNIFER Cintron as PCP - General (Family Medicine)    Chief Complaint:    Chief Complaint   Patient presents with   • Neuro Deficit(s)     RIGHT SIDED FACIAL DROOP, RIGHT SIDED ARM AND LEG WEAKNESS, went to bed around midnight then woke around 1pm with symptoms   • Hypertension     reports bp is normally as high as now       Subjective follow-up refractory hypertension    Interval History:   No headache or vision changes; still weak on right side but better; no N/V or urinary probs    Review of Systems:   As noted above    Objective     Vital Signs  Temp:  [97.7 °F (36.5 °C)-98.2 °F (36.8 °C)] 98 °F (36.7 °C)  Heart Rate:  [50-63] 63  Resp:  [16-18] 18  BP: (108-192)/() 178/93    Flowsheet Rows         First Filed Value    Admission Height  152.4 cm (60\") Documented at 01/03/2018 1802    Admission Weight  77.1 kg (170 lb) Documented at 01/03/2018 1802                No intake or output data in the 24 hours ending 01/08/18 1711    Physical Exam:  General Appearance: alert, oriented x 3, no acute distress, appropriate  Skin: warm and dry  HEENT: Nonicteric sclerae, oral mucosa normal   Neck: supple, no JVD, trachea midline  Lungs: CTA, unlabored breathing effort  Heart: RRR, normal S1 and S2, positive S4, no rub  Abdomen: soft, non-tender, present bowel sounds to auscultation  : no palpable bladder  Extremities: no edema, cyanosis; +clubbing  Neuro: normal speech and mental status      Results Review:      Results from last 7 days  Lab Units 01/08/18  0438 01/07/18  0506 01/04/18  0442 01/03/18  1845   SODIUM mmol/L 140 138 141 144   POTASSIUM mmol/L 4.4 3.8 3.5 3.1*   CHLORIDE mmol/L 101 100 101 103   CO2 mmol/L 26.1 25.3 30.2* 28.6   BUN mg/dL 22 19 8 8   CREATININE mg/dL 0.88 0.91 0.65 0.73   CALCIUM mg/dL 9.8 9.5 9.0 9.4   BILIRUBIN mg/dL  --   --  0.6 0.6   ALK PHOS U/L  --   --  74 79   ALT (SGPT) U/L  --   --  24 27   AST (SGOT) U/L  --   --  10 14   GLUCOSE mg/dL " 92 90 97 110*       Estimated Creatinine Clearance: 58.5 mL/min (by C-G formula based on Cr of 0.88).      Results from last 7 days  Lab Units 01/08/18  0438 01/07/18  0506 01/06/18  0647 01/04/18  0442   MAGNESIUM mg/dL  --  2.4 2.4 2.5*   PHOSPHORUS mg/dL 3.9 4.2  --   --          Results from last 7 days  Lab Units 01/07/18  0506   URIC ACID mg/dL 8.7*         Results from last 7 days  Lab Units 01/04/18  0442 01/03/18  1845   WBC 10*3/mm3 9.78 11.76*   HEMOGLOBIN g/dL 14.7 15.2   PLATELETS 10*3/mm3 281 298         Results from last 7 days  Lab Units 01/04/18  0755 01/03/18  1845   INR  1.08 0.95         Imaging Results (last 24 hours)     ** No results found for the last 24 hours. **          aspirin 325 mg Oral Daily   Or      aspirin 300 mg Rectal Daily   CloNIDine 0.2 mg Oral Q8H   clopidogrel 75 mg Oral Daily   CoQ-10 200 mg Oral Daily   diltiaZEM 240 mg Oral Q24H   losartan 100 mg Oral Q24H   And      hydrochlorothiazide 25 mg Oral Daily   rosuvastatin 20 mg Oral Nightly   spironolactone 100 mg Oral Daily       Pharmacy Consult - Pharmacy to dose        Medication Review:   Current Facility-Administered Medications   Medication Dose Route Frequency Provider Last Rate Last Dose   • aluminum-magnesium hydroxide-simethicone (MAALOX MAX) 400-400-40 MG/5ML suspension 7.5 mL  7.5 mL Oral Q4H PRN Mark Graham MD       • aspirin tablet 325 mg  325 mg Oral Daily Mark Graham MD   325 mg at 01/08/18 0811    Or   • aspirin suppository 300 mg  300 mg Rectal Daily Mark Graham MD       • bisacodyl (DULCOLAX) suppository 10 mg  10 mg Rectal Daily PRN Mark Graham MD       • CloNIDine (CATAPRES) tablet 0.2 mg  0.2 mg Oral Q8H Gaby Becerril MD   0.2 mg at 01/08/18 1411   • clopidogrel (PLAVIX) tablet 75 mg  75 mg Oral Daily Richard Stark MD   75 mg at 01/08/18 0811   • CoQ-10 capsule 200 mg  200 mg Oral Daily Jerry Alonzo MD   200 mg at 01/08/18 0814   • diltiaZEM (TIAZAC) 24 hr  capsule 240 mg  240 mg Oral Q24H Mark Canseco MD       • docusate sodium (COLACE) capsule 100 mg  100 mg Oral BID PRN Mark Graham MD       • losartan (COZAAR) tablet 100 mg  100 mg Oral Q24H Vinicio Ruiz MD   100 mg at 01/08/18 0813    And   • hydrochlorothiazide (HYDRODIURIL) tablet 25 mg  25 mg Oral Daily Vinicio Ruiz MD   25 mg at 01/08/18 0813   • labetalol (NORMODYNE,TRANDATE) injection 20 mg  20 mg Intravenous Q6H PRN Mark Graham MD       • ondansetron (ZOFRAN) injection 4 mg  4 mg Intravenous Q6H PRN Mark Graham MD       • Pharmacy Consult - Pharmacy to dose   Does not apply Continuous PRN JENNIFER Liu       • potassium chloride (MICRO-K) CR capsule 40 mEq  40 mEq Oral PRN Mark Graham MD        Or   • potassium chloride (KLOR-CON) packet 40 mEq  40 mEq Oral PRN Mark Graham MD        Or   • potassium chloride 10 mEq in 100 mL IVPB  10 mEq Intravenous Q1H PRN Mark Graham MD       • rosuvastatin (CRESTOR) tablet 20 mg  20 mg Oral Nightly Kari Davison, APRN   20 mg at 01/07/18 2228   • sodium chloride 0.9 % flush 1-10 mL  1-10 mL Intravenous PRN Mark Graham MD       • sodium chloride 0.9 % flush 10 mL  10 mL Intravenous PRN Marco Antonio Armas MD       • spironolactone (ALDACTONE) tablet 100 mg  100 mg Oral Daily Gaby Becerril MD   100 mg at 01/08/18 0811       Assessment/Plan    1.  Prerenal azotemia, resolved  2.  Refractory hypertension requiring multiple medications: doppler negative, and sameer:renin pending.  Several low K levels despite ARB, suggestive of hyperAldo state  3.  Hypokalemia, stable, tho on both spiron and ARB  4.  Acute CVA, appears to be stable  5.  Dyslipidemia      Plan:  1.  Resume cardizem at home dose of 240 mg daily  2.  F/u in our office next week  3.  Will f/u on sameer:renin ratio.  If high, then will titrate upwards on spironolactone dose  4.  Will have low threshold to add minoxidil if BP's remain  uncontrolled  5.  Will ask Neuro to clarify whether she can drive or not  6.  Ok for home from [facility]  7.  D/w Dr. Martinez earlier          Mark Canseco MD  01/08/18  5:11 PM

## 2018-01-09 LAB
METANEPHRINE, PL: 22 PG/ML (ref 0–62)
NORMETANEPHRINE, PL: 32 PG/ML (ref 0–145)

## 2018-01-09 NOTE — PAYOR COMM NOTE
"Roberta Houston (67 y.o. Female)     Date of Birth Social Security Number Address Home Phone MRN    1950  [address] [place], [place] [id number] [phone] [id number]    [employer] Marital Status          [facility] Single       Admission Date Admission Type Admitting Provider Attending Provider Department, Room/Bed    1/3/18 Emergency EdlingVincent MD  [facility] [address], P578/1    Discharge Date Discharge Disposition Discharge Destination        1/8/2018 Home or Self Care             Attending Provider: (none)    Allergies:  Budesonide, Crestor [Rosuvastatin Calcium], Lipitor [Atorvastatin], Zocor [Simvastatin], Neomycin-bacitracin Zn-polymyx, Vitamin E    Isolation:  None   Infection:  None   Code Status:  Prior    Ht:  152.4 cm (60\")   Wt:  77.1 kg (170 lb)    Admission Cmt:  None   Principal Problem:  Acute CVA (cerebrovascular accident) [I63.9]                 Active Insurance as of 1/3/2018     Primary Coverage     Payor Plan Insurance Group Employer/Plan Group    [employer] [id number]     Payor Plan Address Payor Plan Phone Number Effective From Effective To    [address] [phone] 1/1/2018     [place] [address]       Subscriber Name Subscriber Birth Date Member ID       ROBERTA HOUSTON 1950 [id number]           Secondary Coverage     Payor Plan Insurance Group Employer/Plan Group    MEDICARE MEDICARE A ONLY      Payor Plan Address Payor Plan Phone Number Effective From Effective To    [address] [phone] 9/1/2017     [place] [address]       Subscriber Name Subscriber Birth Date Member ID       ROBERTA HOUSTON 1950 [id number]                 Emergency Contacts      (Rel.) Home Phone Work Phone Mobile Phone    Shanda Julio (Daughter) [phone] -- [phone]          "

## 2018-01-10 LAB
ALDOST SERPL-MCNC: 3.6 NG/DL (ref 0–30)
CREAT 24H UR-MCNC: 44.7 MG/DL
METANEPH/CREAT UR: 0.3 UG/MG CREAT (ref 0–1)
METANEPHS UR-MCNC: 47 UG/L
NORMETANEPHRINE 24H UR-MCNC: 61 UG/L
RENIN PLAS-CCNC: <0.167 NG/ML/HR (ref 0.17–5.38)

## 2018-02-08 ENCOUNTER — TELEPHONE (OUTPATIENT)
Dept: NEUROLOGY | Facility: CLINIC | Age: 68
End: 2018-02-08

## 2018-02-08 NOTE — TELEPHONE ENCOUNTER
I spoke with Ms. Mccormick.  She said she is back to normal.  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 We reviewed her present medications.  Aldactone 100 mg daily, Carapres 0.1 mg 2 times a day (She was on 0.2mg ) Hyzaar 100/25 daily, Norvasc 5 mg daily.  Was on Cardizen CD but discontinued by her PCP Beth RODRIGUEZ, Crestor 20 mg at night,  mg daily and Plavix 75 mg daily.  She told me she had extreme discomfort when starting the Crestor.  She felt like the bottom half of her bottom was too tired to move.  She now takes Co-Q-10 100mg 3 times a day and it has made a significant improvement with her ADL's.  She has already followed up with Ms. Loree RODRIGUEZ on Jan 18th,  She is also aware of her follow up with Kari RODRIGUEZ April 10,2018.  We went over signs and symptoms of stroke and to call 911 immediately.  MARGOT Fan RN mRS 1.  MARGOT Fan RN

## 2018-04-10 ENCOUNTER — OFFICE VISIT (OUTPATIENT)
Dept: NEUROLOGY | Facility: CLINIC | Age: 68
End: 2018-04-10

## 2018-04-10 VITALS
WEIGHT: 160 LBS | HEIGHT: 60 IN | HEART RATE: 89 BPM | OXYGEN SATURATION: 96 % | DIASTOLIC BLOOD PRESSURE: 82 MMHG | SYSTOLIC BLOOD PRESSURE: 158 MMHG | BODY MASS INDEX: 31.41 KG/M2

## 2018-04-10 DIAGNOSIS — I67.2 INTRACRANIAL ATHEROSCLEROSIS: Chronic | ICD-10-CM

## 2018-04-10 DIAGNOSIS — I10 ESSENTIAL HYPERTENSION: ICD-10-CM

## 2018-04-10 DIAGNOSIS — I63.9 CEREBROVASCULAR ACCIDENT (CVA), UNSPECIFIED MECHANISM (HCC): ICD-10-CM

## 2018-04-10 DIAGNOSIS — E78.5 DYSLIPIDEMIA: ICD-10-CM

## 2018-04-10 PROBLEM — I63.032 CEREBROVASCULAR ACCIDENT (CVA) DUE TO THROMBOSIS OF LEFT CAROTID ARTERY (HCC): Status: ACTIVE | Noted: 2018-01-03

## 2018-04-10 PROCEDURE — 99213 OFFICE O/P EST LOW 20 MIN: CPT | Performed by: NURSE PRACTITIONER

## 2018-04-10 RX ORDER — VALSARTAN 320 MG/1
320 TABLET ORAL
COMMUNITY
Start: 2018-03-16 | End: 2018-08-23 | Stop reason: SINTOL

## 2018-04-10 NOTE — PROGRESS NOTES
"DOS: 4/10/2018  NAME: Roberta Houston   : 1950  PCP: JENNIFER Cintron    Chief Complaint   Patient presents with   • Cerebrovascular Accident      Neurological Problem and Interval History:  67 y.o.RHW female with uncontrolled HTN and HLD who presents today to follow-up for stroke.     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 She initially presented to the ER on 1/3/18 with right sided weakness and aphasia. Her imaging confirmed multiple strokes in left MCA cortex as well as the left MCA/NAFISA border zone.  Her vessel imaging showed multiple areas of intracranial stenoses particularly in the left MCA as well as the basilar artery.  The etiology of her strokes were most likely due to intracranial atherosclerosis due to chronically untreated hypertension as well as hyperlipidemia. She was on  mg at the time of her event and has a history of intolerance to statins. She was discharged on ASA, Plavix and Crestor 20mg.     Today she continues on the same medication regimen. She continues on Crestor 20 mg and does c/o LE weakness but she is also taking CoQ10 and magnesium supplements with good results. She did not have any therapy after discharge.  She continues with decreased dexterity of her right arm/hand and also toward the end of her work shift her right leg becomes \"heavier\". She denies any worsening of her initial symptoms or any new stroke/TIA symptoms. She has not had a repeat lipid panel since her discharge. We discussed a goal LDL of less than 70 for stroke prevention and the patient expresses reservations about that goal due to adverse cognitive side effects that she has been reading about.She takes her BP regularly and states it runs in the 130-140s. Her BP medications are managed by her PCP and her clonidine has been decreased recently. She is elevated today but does report history of \"white coat syndrome\". She denies symptoms of sleep apnea. She denies smoking. No " "alcohol use since discharge.     Review of Systems:        Review of Systems   Constitutional: Negative for chills, fatigue and fever.   HENT: Negative for hearing loss, tinnitus and trouble swallowing.    Eyes: Negative for pain, redness and itching.   Respiratory: Negative for cough, shortness of breath and wheezing.    Cardiovascular: Negative for chest pain, palpitations and leg swelling.   Gastrointestinal: Negative for constipation, diarrhea, nausea and vomiting.   Endocrine: Negative for cold intolerance, heat intolerance and polydipsia.   Genitourinary: Negative for decreased urine volume, difficulty urinating, frequency and urgency.   Musculoskeletal: Negative for back pain, gait problem, neck pain and neck stiffness.   Skin: Positive for rash.   Allergic/Immunologic: Positive for environmental allergies. Negative for food allergies and immunocompromised state.   Neurological: Positive for light-headedness. Negative for dizziness, tremors, seizures, syncope, facial asymmetry, speech difficulty, weakness, numbness and headaches.   Hematological: Negative for adenopathy. Does not bruise/bleed easily.   Psychiatric/Behavioral: Negative for agitation, behavioral problems, confusion, decreased concentration, dysphoric mood, hallucinations, self-injury, sleep disturbance and suicidal ideas. The patient is not nervous/anxious and is not hyperactive.        \"The following portions of the patient's history were reviewed and updated as appropriate: allergies, current medications, past family history, past medical history, past social history, past surgical history and problem list.\"  Review and Interpretation of Imaging: Per Dr. Stark,  CT brain 1/3/18: No acute stroke mass or hemorrhage, there are multiple areas of encephalomalacia in the left MCA NAFISA border zone with some areas involving the cortex, is an old stroke in the left head of the caudate, old strokes in the right centrum semiovale ovale, is " calcification of the intracranial vertebral arteries  CT Brain 12/22/17: Similar to the more recent scan but the left frontal white matter lesion appears to be new  MRI brain 1/4/18: Multiple acute strokes in the left MCA territory as well as a left MCA NAFISA border zone, flair shows moderate to mild periventricular white matter disease, SWI sequences are negative postcontrast images negative MRA of the aortic arch is poor quality due to patient motion, both internal carotid arteries are patent although severely tortuous with some areas of dilation particularly distally in the cervical ICAs right worst than left suggestive of fibromuscular dysplasia, the right vertebral artery is patent but the left is absent, MRA [place] of [place] shows a severe flow-limiting stenosis of the distal left middle cerebral artery, is also severe stenosis of the mid-basilar artery, there are diffuse irregularities of the anterior cerebral arteries distally     TTE 1/4/18: Interpretation Summary   · The left ventricular cavity is borderline dilated.  · Left ventricular systolic function is normal. Calculated EF = 60.2%.  · Left ventricular wall thickness is consistent with mild-to-moderate concentric hypertrophy.  · Left ventricular diastolic dysfunction is noted (grade II w/high LAP) consistent with pseudonormalization.  · Normal right ventricular cavity size and systolic function noted  · Left atrial cavity size is mildly dilated.  · There is mild aortic stenosis with a peak gradient of 23 mm Hg, and a mean gradient of 12 mm Hg. .  · Calculated right ventricular systolic pressure from tricuspid regurgitation is 19 mmHg.  · There is no evidence of pericardial effusion.     EKG 1/3/18: SR, LVH    Laboratory Results:      Lab Results   Component Value Date    WBC 9.78 01/04/2018    HGB 14.7 01/04/2018    HCT 45.8 (H) 01/04/2018    MCV 93.7 01/04/2018     01/04/2018     Lab Results   Component Value Date    GLUCOSE 92 01/08/2018    BUN  22 01/08/2018    CREATININE 0.88 01/08/2018    EGFRIFNONA 64 01/08/2018    BCR 25.0 01/08/2018    K 4.4 01/08/2018    CO2 26.1 01/08/2018    CALCIUM 9.8 01/08/2018    ALBUMIN 4.00 01/08/2018    LABIL2 1.3 01/04/2018    AST 10 01/04/2018    ALT 24 01/04/2018                Lab Results   Component Value Date    HGBA1C 5.60 01/04/2018         Lab Results   Component Value Date    CHOL 213 (H) 01/04/2018         Lab Results   Component Value Date    HDL 34 (L) 01/04/2018         Lab Results   Component Value Date     (H) 01/04/2018         Lab Results   Component Value Date    TRIG 198 (H) 01/04/2018     Lab Results   Component Value Date    RPR Non-Reactive 01/05/2018     Lab Results   Component Value Date    TSH 2.270 01/04/2018     Lab Results   Component Value Date    OZQAMKZW26 450 01/05/2018     Lab Results   Component Value Date    CHOL 213 (H) 01/04/2018    TRIG 198 (H) 01/04/2018    HDL 34 (L) 01/04/2018     (H) 01/04/2018         Physical Examination: NIHSS: 1 mRS: 1  General Appearance:   Well developed, well nourished, well groomed, alert, and cooperative. Mildly anxious.  HEENT: Normocephalic.   Neck and Spine: Normal range of motion.  Normal alignment. No mass or tenderness.   Cardiac: Regular rate and rhythm.   Peripheral Vasculature: Radial pulses are equal and symmetric. No signs of distal embolization.  Extremities:    No edema or deformities. Normal joint ROM.  Skin:    No rashes or birth marks.    Neurological examination:  Higher Integrative  Function: Oriented to time, place and person. Normal registration, recall (2/3 with 2 clues). Normal attention span and mildly decreased  concentration. Normal language including comprehension, spontaneous speech, repetition, reading, writing, naming and vocabulary. No neglect with normal visual-spatial function and construction. Normal fund of knowledge and higher integrative function.  CN II: Pupils are equal, round, and reactive to light.  Normal visual acuity and visual fields.    CN III IV VI: Extraocular movements are full without nystagmus.   CN V: Normal facial sensation and strength of muscles of mastication.  CN VII:    Asymmetry of face at baseline with subtle right droop, normal with movement.   CN VIII:   Auditory acuity is normal.  CN IX & X:   Symmetric palatal movement.  CN XI: Sternocleidomastoid and trapezius are normal.  No weakness.  CN XII:   The tongue is midline.  No atrophy or fasciculations.  Motor: Normal muscle strength, bulk and tone in upper and lower extremities except for mild right pronator drift.  No fasciculations, rigidity, spasticity, or abnormal movements.  Sensation: Normal to light touch and proprioception in arms and legs. Normal graphesthesia and no extinction on DSS.  Station and Gait: Normal gait and station.    Coordination: Finger to nose test shows no dysmetria. Heel to shin normal.    Diagnoses / Discussion:  Ms. Houston is doing well following her left hemispheric strokes she suffered in January of this year. She is nearly back to her neurologic baseline with some mild residual right sided weakness. The etiology of her event was likely due to intracranial atherosclerosis secondary to uncontrolled risk factors. She is on the appropriate medication regimen and would recommend continued DAPT with both ASA and Plavix due to her intracranial atherosclerotic disease. We discussed that she will need a repeat lipid panel and LFTs checked since she has not had this done since her discharge. She will follow-up with her PCP for this. Recommend high-dose statin for a goal LDL < 70. She will continue to monitor her BP regularly and record. We discussed importance of risk factor control for stroke prevention, including BP and cholesterol control. We discussed importance of calling 911 for any signs/symptoms of stroke. F/U in 9 months, sooner if symptoms warrant.     Plan:   Repeat lipid panel and LFTs -- patient will f/u  with PCP   Goal LDL <70-recommend high dose statins-    Monitor BP regularly and record   Blood pressure control to <130/80   Serum glucose < 140   Encouraged regular physical activity     Call 911 for stroke any stroke symptoms   Follow-up in 9 months  Roberta was seen today for cerebrovascular accident.    Diagnoses and all orders for this visit:    Cerebrovascular accident (CVA), unspecified mechanism    Intracranial atherosclerosis    Essential hypertension    Dyslipidemia        Coding

## 2018-06-18 ENCOUNTER — TRANSCRIBE ORDERS (OUTPATIENT)
Dept: ADMINISTRATIVE | Facility: HOSPITAL | Age: 68
End: 2018-06-18

## 2018-06-18 DIAGNOSIS — I10 ESSENTIAL HYPERTENSION: Primary | ICD-10-CM

## 2018-06-28 ENCOUNTER — HOSPITAL ENCOUNTER (OUTPATIENT)
Dept: CARDIOLOGY | Facility: HOSPITAL | Age: 68
Discharge: HOME | End: 2018-06-28
Admitting: NURSE PRACTITIONER

## 2018-06-28 VITALS
BODY MASS INDEX: 31.41 KG/M2 | HEIGHT: 60 IN | DIASTOLIC BLOOD PRESSURE: 110 MMHG | SYSTOLIC BLOOD PRESSURE: 194 MMHG | HEART RATE: 92 BPM | WEIGHT: 160 LBS

## 2018-06-28 DIAGNOSIS — I10 ESSENTIAL HYPERTENSION: ICD-10-CM

## 2018-06-28 PROCEDURE — 0399T ADULT TRANSTHORACIC ECHO COMPLETE W/ CONT IF NECESSARY PER PROTOCOL: CPT | Performed by: INTERNAL MEDICINE

## 2018-06-28 PROCEDURE — 0399T HC MYOCARDL STRAIN IMAG QUAN ASSMT PER SESS: CPT

## 2018-06-28 PROCEDURE — 93306 TTE W/DOPPLER COMPLETE: CPT | Performed by: INTERNAL MEDICINE

## 2018-06-28 PROCEDURE — 93306 TTE W/DOPPLER COMPLETE: CPT

## 2018-06-29 LAB
AORTIC DIMENSIONLESS INDEX: 0.6 (DI)
ASCENDING AORTA: 2.2 CM
BH CV ECHO MEAS - ACS: 2 CM
BH CV ECHO MEAS - AO MAX PG (FULL): 16 MMHG
BH CV ECHO MEAS - AO MAX PG: 23.6 MMHG
BH CV ECHO MEAS - AO MEAN PG (FULL): 6.9 MMHG
BH CV ECHO MEAS - AO MEAN PG: 11.6 MMHG
BH CV ECHO MEAS - AO ROOT AREA (BSA CORRECTED): 1.9
BH CV ECHO MEAS - AO ROOT AREA: 8.3 CM^2
BH CV ECHO MEAS - AO ROOT DIAM: 3.2 CM
BH CV ECHO MEAS - AO V2 MAX: 243 CM/SEC
BH CV ECHO MEAS - AO V2 MEAN: 157.4 CM/SEC
BH CV ECHO MEAS - AO V2 VTI: 43.6 CM
BH CV ECHO MEAS - AVA(I,A): 2 CM^2
BH CV ECHO MEAS - AVA(I,D): 2 CM^2
BH CV ECHO MEAS - AVA(V,A): 1.7 CM^2
BH CV ECHO MEAS - AVA(V,D): 1.7 CM^2
BH CV ECHO MEAS - BSA(HAYCOCK): 1.8 M^2
BH CV ECHO MEAS - BSA: 1.7 M^2
BH CV ECHO MEAS - BZI_BMI: 31.2 KILOGRAMS/M^2
BH CV ECHO MEAS - BZI_METRIC_HEIGHT: 152.4 CM
BH CV ECHO MEAS - BZI_METRIC_WEIGHT: 72.6 KG
BH CV ECHO MEAS - CONTRAST EF (2CH): 59.5 ML/M^2
BH CV ECHO MEAS - CONTRAST EF 4CH: 64.1 ML/M^2
BH CV ECHO MEAS - EDV(MOD-SP2): 74 ML
BH CV ECHO MEAS - EDV(MOD-SP4): 78 ML
BH CV ECHO MEAS - EDV(TEICH): 91.2 ML
BH CV ECHO MEAS - EF(CUBED): 79.3 %
BH CV ECHO MEAS - EF(MOD-BP): 62 %
BH CV ECHO MEAS - EF(MOD-SP2): 59.5 %
BH CV ECHO MEAS - EF(MOD-SP4): 64.1 %
BH CV ECHO MEAS - EF(TEICH): 71.8 %
BH CV ECHO MEAS - ESV(MOD-SP2): 30 ML
BH CV ECHO MEAS - ESV(MOD-SP4): 28 ML
BH CV ECHO MEAS - ESV(TEICH): 25.8 ML
BH CV ECHO MEAS - FS: 40.8 %
BH CV ECHO MEAS - IVS/LVPW: 0.95
BH CV ECHO MEAS - IVSD: 1.4 CM
BH CV ECHO MEAS - LAT PEAK E' VEL: 8 CM/SEC
BH CV ECHO MEAS - LV DIASTOLIC VOL/BSA (35-75): 45.9 ML/M^2
BH CV ECHO MEAS - LV MASS(C)D: 264.5 GRAMS
BH CV ECHO MEAS - LV MASS(C)DI: 155.8 GRAMS/M^2
BH CV ECHO MEAS - LV MAX PG: 7.6 MMHG
BH CV ECHO MEAS - LV MEAN PG: 4.7 MMHG
BH CV ECHO MEAS - LV SYSTOLIC VOL/BSA (12-30): 16.5 ML/M^2
BH CV ECHO MEAS - LV V1 MAX: 138.2 CM/SEC
BH CV ECHO MEAS - LV V1 MEAN: 102.7 CM/SEC
BH CV ECHO MEAS - LV V1 VTI: 27.9 CM
BH CV ECHO MEAS - LVIDD: 4.5 CM
BH CV ECHO MEAS - LVIDS: 2.6 CM
BH CV ECHO MEAS - LVLD AP2: 7.2 CM
BH CV ECHO MEAS - LVLD AP4: 7.6 CM
BH CV ECHO MEAS - LVLS AP2: 6.4 CM
BH CV ECHO MEAS - LVLS AP4: 6.4 CM
BH CV ECHO MEAS - LVOT AREA (M): 3.1 CM^2
BH CV ECHO MEAS - LVOT AREA: 3.1 CM^2
BH CV ECHO MEAS - LVOT DIAM: 2 CM
BH CV ECHO MEAS - LVPWD: 1.5 CM
BH CV ECHO MEAS - MED PEAK E' VEL: 6 CM/SEC
BH CV ECHO MEAS - MR MAX PG: 25.5 MMHG
BH CV ECHO MEAS - MR MAX VEL: 252.5 CM/SEC
BH CV ECHO MEAS - MV A DUR: 0.12 SEC
BH CV ECHO MEAS - MV A MAX VEL: 105.7 CM/SEC
BH CV ECHO MEAS - MV DEC SLOPE: 264.9 CM/SEC^2
BH CV ECHO MEAS - MV DEC TIME: 0.27 SEC
BH CV ECHO MEAS - MV E MAX VEL: 71.3 CM/SEC
BH CV ECHO MEAS - MV E/A: 0.67
BH CV ECHO MEAS - MV MAX PG: 5.6 MMHG
BH CV ECHO MEAS - MV MEAN PG: 2.5 MMHG
BH CV ECHO MEAS - MV P1/2T MAX VEL: 73.2 CM/SEC
BH CV ECHO MEAS - MV P1/2T: 81 MSEC
BH CV ECHO MEAS - MV V2 MAX: 118.1 CM/SEC
BH CV ECHO MEAS - MV V2 MEAN: 75.9 CM/SEC
BH CV ECHO MEAS - MV V2 VTI: 28.9 CM
BH CV ECHO MEAS - MVA P1/2T LCG: 3 CM^2
BH CV ECHO MEAS - MVA(P1/2T): 2.7 CM^2
BH CV ECHO MEAS - MVA(VTI): 3 CM^2
BH CV ECHO MEAS - PA ACC TIME: 0.12 SEC
BH CV ECHO MEAS - PA MAX PG (FULL): 5.3 MMHG
BH CV ECHO MEAS - PA MAX PG: 7.8 MMHG
BH CV ECHO MEAS - PA PR(ACCEL): 26.7 MMHG
BH CV ECHO MEAS - PA V2 MAX: 139.8 CM/SEC
BH CV ECHO MEAS - PULM A REVS DUR: 0.11 SEC
BH CV ECHO MEAS - PULM A REVS VEL: 37.3 CM/SEC
BH CV ECHO MEAS - PULM DIAS VEL: 44.6 CM/SEC
BH CV ECHO MEAS - PULM S/D: 1.3
BH CV ECHO MEAS - PULM SYS VEL: 58.2 CM/SEC
BH CV ECHO MEAS - RAP SYSTOLE: 3 MMHG
BH CV ECHO MEAS - RV MAX PG: 2.6 MMHG
BH CV ECHO MEAS - RV MEAN PG: 1.2 MMHG
BH CV ECHO MEAS - RV V1 MAX: 80 CM/SEC
BH CV ECHO MEAS - RV V1 MEAN: 51.3 CM/SEC
BH CV ECHO MEAS - RV V1 VTI: 11.4 CM
BH CV ECHO MEAS - RVSP: 23 MMHG
BH CV ECHO MEAS - SI(AO): 212.6 ML/M^2
BH CV ECHO MEAS - SI(CUBED): 41.8 ML/M^2
BH CV ECHO MEAS - SI(LVOT): 50.5 ML/M^2
BH CV ECHO MEAS - SI(MOD-SP2): 25.9 ML/M^2
BH CV ECHO MEAS - SI(MOD-SP4): 29.5 ML/M^2
BH CV ECHO MEAS - SI(TEICH): 38.5 ML/M^2
BH CV ECHO MEAS - SUP REN AO DIAM: 2.1 CM
BH CV ECHO MEAS - SV(AO): 360.9 ML
BH CV ECHO MEAS - SV(CUBED): 71 ML
BH CV ECHO MEAS - SV(LVOT): 85.8 ML
BH CV ECHO MEAS - SV(MOD-SP2): 44 ML
BH CV ECHO MEAS - SV(MOD-SP4): 50 ML
BH CV ECHO MEAS - SV(TEICH): 65.4 ML
BH CV ECHO MEAS - TAPSE (>1.6): 2.5 CM2
BH CV ECHO MEAS - TR MAX VEL: 221.3 CM/SEC
BH CV ECHO MEASUREMENTS AVERAGE E/E' RATIO: 10.19
BH CV XLRA - RV BASE: 2.7 CM
BH CV XLRA - TDI S': 16 CM/SEC
LEFT ATRIUM VOLUME INDEX: 62 ML/M2
LV EF 2D ECHO EST: 62 %
MAXIMAL PREDICTED HEART RATE: 155 BPM
SINUS: 3 CM
STJ: 2.5 CM
STRESS TARGET HR: 132 BPM
Z-SCORE OF LEFT VENTRICULAR DIMENSION IN END SYSTOLE: 3.9

## 2018-08-23 ENCOUNTER — OFFICE VISIT (OUTPATIENT)
Dept: CARDIOLOGY | Facility: CLINIC | Age: 68
End: 2018-08-23

## 2018-08-23 VITALS
HEART RATE: 90 BPM | SYSTOLIC BLOOD PRESSURE: 178 MMHG | DIASTOLIC BLOOD PRESSURE: 102 MMHG | WEIGHT: 164 LBS | HEIGHT: 60 IN | BODY MASS INDEX: 32.2 KG/M2

## 2018-08-23 DIAGNOSIS — I63.9 CEREBROVASCULAR ACCIDENT (CVA), UNSPECIFIED MECHANISM (HCC): ICD-10-CM

## 2018-08-23 DIAGNOSIS — I10 ESSENTIAL HYPERTENSION: Primary | ICD-10-CM

## 2018-08-23 DIAGNOSIS — E78.5 DYSLIPIDEMIA: ICD-10-CM

## 2018-08-23 PROCEDURE — 99203 OFFICE O/P NEW LOW 30 MIN: CPT | Performed by: INTERNAL MEDICINE

## 2018-08-23 PROCEDURE — 93000 ELECTROCARDIOGRAM COMPLETE: CPT | Performed by: INTERNAL MEDICINE

## 2018-08-23 RX ORDER — METOPROLOL SUCCINATE 50 MG/1
50 TABLET, EXTENDED RELEASE ORAL DAILY
Qty: 30 TABLET | Refills: 11 | Status: SHIPPED | OUTPATIENT
Start: 2018-08-23 | End: 2019-07-29 | Stop reason: SDUPTHER

## 2018-08-23 RX ORDER — HYDRALAZINE HYDROCHLORIDE 25 MG/1
2 TABLET, FILM COATED ORAL 2 TIMES DAILY
Refills: 0 | COMMUNITY
Start: 2018-08-03 | End: 2018-10-08 | Stop reason: DRUGHIGH

## 2018-08-23 NOTE — PROGRESS NOTES
Date of Office Visit: 2018  Encounter Provider: Miguel Angel Means MD  Place of Service: [facility] CARDIOLOGY  Patient Name: Roberta Houston  :1950  Beth Ralph APRN    Chief Complaint   Patient presents with   • Hypertension     History of Present Illness  The patient is a 67-year-old white female who is referred for management of hypertension that has been difficult to control.    The patient's hypertension has been present now for many years.  In January of this year she was hospitalized with cerebrovascular accident presumably secondary to uncontrolled hypertension.  Over the years she's been on a variety of medications and most recently is on Aldactone, clonidine and hydralazine.  Her systolic pressures generally are difficult to control oftentimes spiking up into the 190 range.  Symptomatically she has shortness of breath with exertion and an occasional headache.  She does not complain of any visual disturbances.  She does have a history of swelling associated with some antihypertensives.  She had been on amlodipine but this was discontinued because her rash.  She recalls being on a beta blocker several years ago and thinks it was discontinued because of swelling      Past Medical History:   Diagnosis Date   • Hypertension    • Stroke (CMS/HCC)    • TIA (transient ischemic attack)          Past Surgical History:   Procedure Laterality Date   • TONSILLECTOMY     • TUBAL ABDOMINAL LIGATION     • WISDOM TOOTH EXTRACTION             Current Outpatient Prescriptions:   •  aspirin 325 MG tablet, Take 1 tablet by mouth Daily., Disp: 30 tablet, Rfl: 1  •  CloNIDine (CATAPRES) 0.1 MG tablet, Take 0.1 mg by mouth Daily., Disp: , Rfl:   •  clopidogrel (PLAVIX) 75 MG tablet, Take 1 tablet by mouth Daily., Disp: 30 tablet, Rfl: 1  •  Coenzyme Q10 (COQ-10) 100 MG capsule, Take 200 mg by mouth Daily. (Patient taking differently: Take 100 mg by mouth Daily. Taking 3 to 4 daily),  "Disp: , Rfl:   •  hydrALAZINE (APRESOLINE) 25 MG tablet, Take 2 tablets by mouth 2 (Two) Times a Day., Disp: , Rfl: 0  •  spironolactone (ALDACTONE) 100 MG tablet, Take 1 tablet by mouth Daily., Disp: 30 tablet, Rfl: 1  •  metoprolol succinate XL (TOPROL-XL) 50 MG 24 hr tablet, Take 1 tablet by mouth Daily., Disp: 30 tablet, Rfl: 11      Social History     Social History   • Marital status: Single     Spouse name: N/A   • Number of children: N/A   • Years of education: N/A     Occupational History   • Not on file.     Social History Main Topics   • Smoking status: Never Smoker   • Smokeless tobacco: Never Used   • Alcohol use No   • Drug use: No   • Sexual activity: Not on file     Other Topics Concern   • Not on file     Social History Narrative   • No narrative on file         Review of Systems   Constitution: Positive for malaise/fatigue.   HENT: Negative.    Eyes: Negative.    Cardiovascular: Negative.    Respiratory: Negative.    Endocrine: Negative.    Skin: Negative.    Musculoskeletal: Negative.    Gastrointestinal: Negative.    Neurological: Negative.    Psychiatric/Behavioral: Negative.        Procedures      ECG 12 Lead  Date/Time: 8/23/2018 9:48 AM  Performed by: SIMONE HERNANDEZ  Authorized by: SIMONE HERNANDEZ   Comparison: compared with previous ECG from 1/3/2018  Similar to previous ECG  Rhythm: sinus rhythm  Rate: normal  Conduction: conduction normal  ST Segments: ST segments normal  QRS axis: normal  Other findings: PRWP                Objective:    BP (!) 178/102   Pulse 90   Ht 152.4 cm (60\")   Wt 74.4 kg (164 lb)   BMI 32.03 kg/m²         Physical Exam   Constitutional: She is oriented to person, place, and time. She appears well-developed and well-nourished.   HENT:   Head: Normocephalic.   Eyes: Pupils are equal, round, and reactive to light.   Neck: Normal range of motion. No JVD present. Carotid bruit is not present. No thyromegaly present.   Cardiovascular: Normal rate, regular " rhythm, S1 normal, S2 normal and intact distal pulses.  Exam reveals no gallop and no friction rub.    Murmur heard.   Harsh midsystolic murmur is present with a grade of 1/6  at the upper right sternal border radiating to the neck  Pulmonary/Chest: Effort normal and breath sounds normal.   Abdominal: Soft. Bowel sounds are normal.   Musculoskeletal: She exhibits no edema.   Neurological: She is alert and oriented to person, place, and time.   Skin: Skin is warm, dry and intact. No erythema.   Psychiatric: She has a normal mood and affect.   Vitals reviewed.          Assessment:       Diagnosis Plan   1. Essential hypertension     2. Cerebrovascular accident (CVA), unspecified mechanism (CMS/HCC)     3. Dyslipidemia     4. Aortic Stenosis         Plan:     Going to initiate metoprolol succinate 50 mg daily.  I advised her of the fatigue side effects.  We'll have her follow-up in a month.  At some point we may consider a sleep evaluation if her pressure remains uncontrolled.  Sodium intake has also been discussed

## 2018-09-24 ENCOUNTER — OFFICE VISIT (OUTPATIENT)
Dept: CARDIOLOGY | Facility: CLINIC | Age: 68
End: 2018-09-24

## 2018-09-24 VITALS
WEIGHT: 163 LBS | DIASTOLIC BLOOD PRESSURE: 94 MMHG | SYSTOLIC BLOOD PRESSURE: 208 MMHG | HEIGHT: 60 IN | BODY MASS INDEX: 32 KG/M2 | HEART RATE: 71 BPM

## 2018-09-24 DIAGNOSIS — R21 RASH AND NONSPECIFIC SKIN ERUPTION: ICD-10-CM

## 2018-09-24 DIAGNOSIS — I10 ESSENTIAL HYPERTENSION: Primary | ICD-10-CM

## 2018-09-24 PROBLEM — I07.1 TRICUSPID REGURGITATION: Status: ACTIVE | Noted: 2018-01-04

## 2018-09-24 PROBLEM — I34.0 MITRAL REGURGITATION: Status: ACTIVE | Noted: 2018-01-04

## 2018-09-24 PROBLEM — Z86.73 HISTORY OF CVA (CEREBROVASCULAR ACCIDENT): Status: ACTIVE | Noted: 2018-01-03

## 2018-09-24 PROBLEM — I35.0 NONRHEUMATIC AORTIC VALVE STENOSIS: Status: ACTIVE | Noted: 2018-01-04

## 2018-09-24 PROCEDURE — 93000 ELECTROCARDIOGRAM COMPLETE: CPT | Performed by: NURSE PRACTITIONER

## 2018-09-24 PROCEDURE — 99214 OFFICE O/P EST MOD 30 MIN: CPT | Performed by: NURSE PRACTITIONER

## 2018-09-24 RX ORDER — METOPROLOL SUCCINATE 50 MG/1
TABLET, EXTENDED RELEASE ORAL
COMMUNITY
Start: 2018-08-23 | End: 2018-09-25 | Stop reason: SDUPTHER

## 2018-09-24 RX ORDER — CHLORAL HYDRATE 500 MG
1000 CAPSULE ORAL
COMMUNITY

## 2018-09-24 RX ORDER — HYDRALAZINE HYDROCHLORIDE 25 MG/1
25 TABLET, FILM COATED ORAL
COMMUNITY
Start: 2018-08-03 | End: 2018-09-24 | Stop reason: SDUPTHER

## 2018-09-24 NOTE — PROGRESS NOTES
Date of Office Visit: 2018  Encounter Provider: JENNIFER Hines  Place of Service: [facility] CARDIOLOGY  Patient Name: Roberta Houston  :1950      Subjective:     Chief Complaint: HTN    History of Present Illness:  Roberta Houston is a pleasant 67 y.o. female who is new to me.  Outside records have been obtained and reviewed by me.  The patient has past medical history of difficult to control hypertension, stroke, TIA and allergies.    The patient was seen by Dr. Means 18 in consultation during hospital admission for CVA presumably secondary to uncontrolled hypertension.  The years she has been on a variety of medications and most recently is on Aldactone, clonidine and hydralazine.  Her systolic pressures are generally difficult call to control often times spiking up to the 190 range.  Symptomatically she has shortness of breath with exertion and an occasional headache.  He does not complain of any visual disturbances.  He does have a history of swelling associated with some antihypertensives.  She had been on amlodipine but this was discontinued because of her rash.  She recalls being on a beta blocker several years ago and thinks it was discontinued because of swelling.  At this visit Dr. Means added metoprolol succinate 50 mg daily. He reported if this med was ineffective for controlling her hypertension that we may consider ordering a sleep eval.      The patient is here today for a one-month follow-up after starting a beta blocker for her uncontrolled hypertension.  Today she has a rash today on her neck and hands.  He reports it has been getting gradually worse over the last 4 weeks.  He last saw her PCP last week and forgot to mention it but she says it has gotten even worse over this last week.  She has had intermittent rashes in the past in which her PCP has treated her with steroid cream.  She is unsure if it is related to the beta blocker but she  "said the time being could be that it is related to the beta blocker.  She states that it is difficult to determine because she has known allergens to materials she is exposed to at work.  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  She has not had allergy testing in about 11 years.  He has been quite a while since she is followed up with her allergist.  Her blood pressure in office today initially was 224/112.  She was due to take a dose of her hydralazine which she actually cut her dose to 25 mg in half and takes 12.5 mg 4 times daily which she states typically works better for her.  She took a dose in office and recheck her blood pressure was 208/94.  The patient was completely asymptomatic with this, she denied any headaches, visual changes, weakness of any extremities, dizziness, lightheadedness.  The only thing she stated was that she felt \"tense\" which is her normal.  She did report that she accidentally messed up her blood pressure medications last night.  She normally takes the metoprolol succinate 50 mg in the morning and her clonidine 0.1 mg at bedtime.  Last night she accidentally took an additional dose of metoprolol succinate, and because she didn't want to overdose she ended up not taking her clonidine at all last night.  She denies any chest pain, PND, orthopnea, dizziness, syncope, near syncope.  She does report some dyspnea on exertion which is unchanged from reports in the past. She also reports some increase swelling in her fingers recently. She also reports she believes she tolerated Losartan- HCTZ in the past just not Valsartan. She also states she thinks she has tolerated Diltiazem as well as a \"waterpill.\" She states she was taken off and increased dose of Clonidine (0.2mg) more frequently (TID) because of low HR and B/p but she believes she was on Diltiazem " at the same time as well.     Testing/Procedures:  6/18/18 Echo:  · Left ventricular wall thickness is consistent with moderate concentric hypertrophy.  · Left ventricular systolic function is normal. Estimated EF = 62%.  · Left ventricular diastolic dysfunction (grade I) consistent with impaired relaxation.  · Global Longitudinal LV strain = -18%.  · Mild aortic valve stenosis is present.  · Mild mitral valve regurgitation is present  · Mild tricuspid valve regurgitation is present.  · Calculated right ventricular systolic pressure from tricuspid regurgitation is 23 mmHg.       1/4/18 Echo:  Left ventricular wall thickness is consistent with moderate concentric hypertrophy.  Left ventricular systolic function is normal. Estimated EF = 62%.  Left ventricular diastolic dysfunction (grade I) consistent with impaired relaxation.  Global Longitudinal LV strain = -18%.  Mild aortic valve stenosis is present.  Mild mitral valve regurgitation is present  Mild tricuspid valve regurgitation is present.  Calculated right ventricular systolic pressure from tricuspid regurgitation is 23 mmHg.      Past Medical History:   Diagnosis Date   • Hypertension    • Stroke (CMS/HCC)    • TIA (transient ischemic attack)      Past Surgical History:   Procedure Laterality Date   • TONSILLECTOMY     • TUBAL ABDOMINAL LIGATION     • WISDOM TOOTH EXTRACTION       Outpatient Medications Prior to Visit   Medication Sig Dispense Refill   • aspirin 325 MG tablet Take 1 tablet by mouth Daily. 30 tablet 1   • CloNIDine (CATAPRES) 0.1 MG tablet Take 0.1 mg by mouth Daily.     • clopidogrel (PLAVIX) 75 MG tablet Take 1 tablet by mouth Daily. 30 tablet 1   • Coenzyme Q10 (COQ-10) 100 MG capsule Take 200 mg by mouth Daily. (Patient taking differently: Take 100 mg by mouth Daily. Taking 3 to 4 daily)     • hydrALAZINE (APRESOLINE) 25 MG tablet Take 2 tablets by mouth 2 (Two) Times a Day.  0   • metoprolol succinate XL (TOPROL-XL) 50 MG 24 hr tablet Take  1 tablet by mouth Daily. 30 tablet 11   • spironolactone (ALDACTONE) 100 MG tablet Take 1 tablet by mouth Daily. 30 tablet 1     No facility-administered medications prior to visit.        Allergies as of 09/24/2018 - Reviewed 09/24/2018   Allergen Reaction Noted   • Valsartan Rash 09/24/2018   • Budesonide  12/18/2017   • Crestor [rosuvastatin calcium] Other (See Comments) 01/04/2018   • Lipitor [atorvastatin] Other (See Comments) 01/04/2018   • Zocor [simvastatin] Other (See Comments) 01/04/2018   • Amlodipine Rash 02/15/2018   • Neomycin-bacitracin zn-polymyx Rash 12/18/2017   • Vitamin e Rash 12/18/2017     Social History     Social History   • Marital status: Single     Spouse name: N/A   • Number of children: N/A   • Years of education: N/A     Occupational History   • Not on file.     Social History Main Topics   • Smoking status: Never Smoker   • Smokeless tobacco: Never Used   • Alcohol use No   • Drug use: No   • Sexual activity: Not on file     Other Topics Concern   • Not on file     Social History Narrative   • No narrative on file     Family History   Problem Relation Age of Onset   • Hyperlipidemia Mother    • Migraines Mother    • Cancer Father         Urinary bladder   • Migraines Father    • No Known Problems Maternal Grandmother    • No Known Problems Maternal Grandfather    • No Known Problems Paternal Grandmother    • No Known Problems Paternal Grandfather      Review of Systems   Constitution: Negative for chills, fever and malaise/fatigue.   HENT: Negative for ear pain, hearing loss, nosebleeds and sore throat.    Eyes: Negative for double vision, pain, vision loss in left eye and vision loss in right eye.   Cardiovascular: Positive for dyspnea on exertion. Negative for chest pain, claudication, irregular heartbeat, leg swelling (intermittent), near-syncope, orthopnea, palpitations, paroxysmal nocturnal dyspnea and syncope.        Mild swelling of the fingers   Respiratory: Negative for cough,  "shortness of breath, snoring and wheezing.    Endocrine: Negative for cold intolerance and heat intolerance.   Hematologic/Lymphatic: Negative for bleeding problem.   Skin: Positive for rash (gradually worse over the last 4 weeks, face, back of neck and hands). Negative for color change, itching and unusual hair distribution.   Musculoskeletal: Negative for joint pain and joint swelling.   Gastrointestinal: Negative for abdominal pain, diarrhea, hematochezia, melena, nausea and vomiting.   Genitourinary: Negative for decreased libido, frequency, hematuria, hesitancy and incomplete emptying.   Neurological: Negative for excessive daytime sleepiness, dizziness, headaches, light-headedness, loss of balance, numbness, paresthesias and seizures.   Psychiatric/Behavioral: Negative for depression.        Tension          Objective:     Vitals:    09/24/18 1429 09/24/18 1539   BP: (!) 224/112 (!) 208/94   BP Location: Left arm Right arm   Patient Position: Sitting Sitting   Pulse: 71    Weight: 73.9 kg (163 lb)    Height: 152.4 cm (60\")      Body mass index is 31.83 kg/m².    PHYSICAL EXAM:  Physical Exam   Constitutional: She is oriented to person, place, and time. She appears well-developed and well-nourished. No distress.   HENT:   Head: Normocephalic and atraumatic.   Eyes: Pupils are equal, round, and reactive to light.   Neck: Trachea normal. Neck supple. No JVD present. Carotid bruit is not present.   Cardiovascular: Normal rate, regular rhythm and intact distal pulses.    Murmur heard.   Harsh midsystolic murmur is present with a grade of 1/6  at the upper right sternal border radiating to the neck  Pulses:       Radial pulses are 2+ on the right side, and 2+ on the left side.   Pulmonary/Chest: Effort normal and breath sounds normal. No respiratory distress.   Abdominal: Soft. Bowel sounds are normal. She exhibits no distension. There is no splenomegaly or hepatomegaly. There is no tenderness.   Obese. "   Musculoskeletal: Normal range of motion. She exhibits no edema.   Neurological: She is alert and oriented to person, place, and time.   Skin: Skin is warm, dry and intact. Rash (face, neck, bilateral hands) noted. She is not diaphoretic. No erythema.   Psychiatric: She has a normal mood and affect. Her speech is normal and behavior is normal. Judgment and thought content normal. Cognition and memory are normal.   Nursing note and vitals reviewed.        ECG 12 Lead  Date/Time: 9/24/2018 2:50 PM  Performed by: FLORENCE MERINO  Authorized by: FLORENCE MERINO   Comparison: compared with previous ECG from 8/23/2018  Similar to previous ECG  Comparison to previous ECG: Reviewed EKG with Dr. Keys- no significant changes with prior ECG  Rhythm: sinus rhythm  Rate: normal  BPM: 71  Clinical impression: abnormal ECG  Comments: Probable LVH  Indication: Uncontrolled HTN            Assessment:       Diagnosis Plan   1. Essential hypertension     2. Rash and nonspecific skin eruption         Plan:     1. Uncontrolled Hypertension: B/P Initially 224/118 and then came down to 208/94. She is asymptomatic and not showing any signs of end organ damage.  She is currently taking spironolactone 100 mg daily which her lab work is being monitored by her PCP.,  Clonidine 0.1 mg daily, Toprol-XL 50 mg daily and hydralazine 12.5 mg 4 times a day. She did take her Hydralazine dose in the office. She did report that she accidentally messed up her blood pressure medications last night.  She normally takes metoprolol succinate 50 mg in the morning and her clonidine 0.1 mg at bedtime.  Last night she accidentally took an additional dose of metoprolol in because she didn't want to overdose she ended up not taking her clonidine last night. She states she believes they are running 150s-170s/70s-90s at home. I would be hard pressed to make any changes to her medication at this time as I am probably not getting an accurate assessment of her blood  pressures as she did not take her medications correctly last night.  I advised the patient to bring in her blood pressure cuff to the hospital where she works and have someone check it for accuracy.  I advised her to check her blood pressure at least 3 times a day and bring it into her next visit with me in 2 weeks and from there I will decide how to titrate or change her medications. I also advised her on avoiding sodium intake.     Patient had negative bilateral renal artery duplex January 2018 and was cleared by nephrology. I will consider adding Loop diuretic in the future as she reports increase in finger swelling and believes she tolerated this ok in the past. She also reports she believes she tolerated Losartan- HCTZ in the past just not Valsartan. I will also consider referral for sleep eval for EDSON.     2. Rash:Present on face, back of neck and hands. Per patient report has gotten gradually worse over the last 4 weeks. Saw PCP last week and forgot to mention it but does report that since that visit the rash has gotten even worse. She has multiple intolerances to medications in the past.  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  She used to see an allergist in the past.  I advised the patient to follow-up with her allergist and her PCP to treat this rash.  We need to determine if this rash is actually caused by her blood pressure medications or by something that she is exposed to at work.  It is difficult to treat her uncontrolled hypertension if we are having to discontinue multiple antihypertensive medications because of rashes.     3. Abnormal EKG- may need a stress test?    The patient was educated on what symptoms that I needed to know about or when to present to the emergency department for hypertensive emergency.  She demonstrated understanding.      Follow up with JENNIFER Tinoco in 2 weeks, unless otherwise needed sooner.  I advised the patient to  contact our office with any questions or concerns.         Your medication list          Accurate as of 9/24/18  5:20 PM. If you have any questions, ask your nurse or doctor.               CHANGE how you take these medications      Instructions Last Dose Given Next Dose Due   CoQ-10 100 MG capsule  What changed:  · how much to take  · additional instructions      Take 200 mg by mouth Daily.       hydrALAZINE 25 MG tablet  Commonly known as:  APRESOLINE  What changed:  Another medication with the same name was removed. Continue taking this medication, and follow the directions you see here.  Changed by:  JENNIFER Hines      Take 2 tablets by mouth 2 (Two) Times a Day.          CONTINUE taking these medications      Instructions Last Dose Given Next Dose Due   aspirin 325 MG tablet      Take 1 tablet by mouth Daily.       CloNIDine 0.1 MG tablet  Commonly known as:  CATAPRES      Take 0.1 mg by mouth Daily.       clopidogrel 75 MG tablet  Commonly known as:  PLAVIX      Take 1 tablet by mouth Daily.       fish oil 1000 MG capsule capsule      Take 1,000 mg by mouth Daily With Breakfast.       metoprolol succinate XL 50 MG 24 hr tablet  Commonly known as:  TOPROL-XL      Take 1 tablet by mouth Daily.       metoprolol succinate XL 50 MG 24 hr tablet  Commonly known as:  TOPROL-XL      TK 1 T PO D       spironolactone 100 MG tablet  Commonly known as:  ALDACTONE      Take 1 tablet by mouth Daily.              The above medication changes may not have been made by this provider.  Medication list was updated to reflect medications patient is currently taking including medication changes and discontinuations made by other healthcare providers.       I have reviewed this case with Calli Ewing PA-C. It has been a pleasure to participate in this patient's care. Please feel free to contact me with any questions or concerns.     JENNIFER Hines  09/24/2018       Dictated utilizing Dragon Dictation System.

## 2018-10-05 NOTE — PROGRESS NOTES
Date of Office Visit: 10/08/2018  Encounter Provider: JENNIFER Hines  Place of Service: [facility] CARDIOLOGY  Patient Name: Roberta [place]  :1950      Subjective:     Chief Complaint:   Uncontrolled HTN and rash    History of Present Illness:  Roberta [place] is a pleasant 68 y.o. female.  She has a past medical history of hypertension as well as stroke    I last saw her on 18 for a one-month follow-up after starting a beta blocker for her uncontrolled hypertension.  She had a rash today on her neck and hands that had gradually gotten worse over the last 4 weeks.  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   She last saw her PCP the week prior and forgot to mention it but she says it had gotten even worse over the week prior to seeing me. She also had elevated blood pressures at 224/112.   She has had intermittent rashes in the past in which her PCP has treated her with steroid cream.  She was unsure if it is related to the beta blocker but she said the timing could be that it is related to the beta blocker.  She states that it is difficult to determine because she has known allergens to materials she is exposed to at work.  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  She has not had allergy testing in about 11 years.  It has been quite a while since she is followed up with her allergist.   She also had elevated blood pressures at 224/112 in the office that day. She was due to take a dose of her hydralazine which she actually cut her dose to 25 mg in half and takes 12.5 mg 4 times daily which she states typically works better for her.  She took a dose in office that and we recheck her blood pressure was 208/94.  The patient was completely asymptomatic with  "this, she denied any headaches, visual changes, weakness of any extremities, dizziness, lightheadedness.  The only thing she stated was that she felt \"tense\" which is her normal.  She did report that she accidentally messed up her blood pressure medications the night prior to her visit.  She normally takes the metoprolol succinate 50 mg in the morning and her clonidine 0.1 mg at bedtime. The night prior to her visit with me she accidentally took an additional dose of metoprolol succinate, and because she didn't want to overdose she ended up not taking her clonidine at all that night.  She denied any chest pain, PND, orthopnea, dizziness, syncope, or near syncope.  She did  report some dyspnea on exertion which is unchanged from reports in the past. She also reported some increase swelling in her fingers recently. She also reported she believes she tolerated Losartan- HCTZ in the past just not Valsartan. She also states she thinks she has tolerated Diltiazem as well as a \"waterpill.\" She stateed she was taken off an increased dose of Clonidine (0.2mg) more frequently (TID) because of low HR and B/P but she believes she was on Diltiazem at the same time as well.  She has had a complicated history of trying to treat her hypertension.  She also works the night shift as a respiratory therapist.    I wanted to see her back in 2 weeks because I didn't feel that I was getting an accurate assessment of her blood pressure since she missed a dose of her clonidine the night prior to me last seeing her in the office.  I asked her to follow back up with her PCP in her allergist in regards to her rash because we need to determine if it is actually caused by her blood pressure medications are by something that she has been exposed to at work.  I wanted her to take her blood pressure 3 times a day and bring it to her next visit with me in 2 weeks and from there I would decide how to titrate her medications.  She was supposed to have " someone at work at the hospital verify her cuff for accuracy as well.  I stated I would consider adding a loop diuretic in the future as she believes she was having finger swelling and she thinks she's tolerated that in the past.  I also stated that she may need a referral for a sleep evaluation as well as a possible stress test related to an abnormal EKG.      She is here today to follow back up in regards to her uncontrolled HTN.  He brought in the blood pressure log and her blood pressure is ranging from 149-185/57-92. She had one isolated b/p reading of 123/73 on 10/7/18 at 10:45 pm and she felt lightheaded with this.  HR ranging from 54-78. Average blood pressures appear to be running in the 150s-160s/70s-80s. The patient refused to have another EKG done today on 10/8/18 although I explained to her why we wanted to check an EKG to make sure she was not showing signs of ischemia, etc. She demonstrated understanding and explained her reasoning was related to the cost each time she comes in addition to her co-pay.  The patient reports she is feeling about the same and probably even better compared to the last time I saw her a couple weeks ago.  She reports some dyspnea on exertion especially with carrying heavy boxes and having to walk.  She reports chronic fatigue which she attributes to the night shift as a respiratory therapist.  She reports one episode last night of lightheadedness when her blood pressure was 123/73.  She denies any syncope, near syncope or falls.  She denies any chest pain or swelling of the legs, but reports some increased swelling of her fingers.  She denies any increased salt intake recently.  She reports that she does not feel that she snores and wakes up at night related to apnea but would be willing to be evaluated for sleep apnea because of her uncontrolled hypertension.        Past Medical History:   Diagnosis Date   • Hypertension    • Stroke (CMS/Formerly [facility])    • TIA (transient ischemic  attack)      Past Surgical History:   Procedure Laterality Date   • TONSILLECTOMY     • TUBAL ABDOMINAL LIGATION     • WISDOM TOOTH EXTRACTION       Outpatient Medications Prior to Visit   Medication Sig Dispense Refill   • aspirin 325 MG tablet Take 1 tablet by mouth Daily. 30 tablet 1   • CloNIDine (CATAPRES) 0.1 MG tablet Take 0.1 mg by mouth Daily.     • clopidogrel (PLAVIX) 75 MG tablet Take 1 tablet by mouth Daily. 30 tablet 1   • Coenzyme Q10 (COQ-10) 100 MG capsule Take 200 mg by mouth Daily. (Patient taking differently: Take 100 mg by mouth Daily. Taking 3 to 4 daily)     • metoprolol succinate XL (TOPROL-XL) 50 MG 24 hr tablet Take 1 tablet by mouth Daily. 30 tablet 11   • Omega-3 Fatty Acids (FISH OIL) 1000 MG capsule capsule Take 1,000 mg by mouth Daily With Breakfast.     • spironolactone (ALDACTONE) 100 MG tablet Take 1 tablet by mouth Daily. 30 tablet 1   • hydrALAZINE (APRESOLINE) 25 MG tablet Take 2 tablets by mouth 2 (Two) Times a Day.  0     No facility-administered medications prior to visit.        Allergies as of 10/08/2018 - Reviewed 10/08/2018   Allergen Reaction Noted   • Valsartan Rash 09/24/2018   • Budesonide  12/18/2017   • Crestor [rosuvastatin calcium] Other (See Comments) 01/04/2018   • Lipitor [atorvastatin] Other (See Comments) 01/04/2018   • Zocor [simvastatin] Other (See Comments) 01/04/2018   • Amlodipine Rash 02/15/2018   • Neomycin-bacitracin zn-polymyx Rash 12/18/2017   • Vitamin e Rash 12/18/2017     Social History     Social History   • Marital status: Single     Spouse name: N/A   • Number of children: N/A   • Years of education: N/A     Occupational History   • Not on file.     Social History Main Topics   • Smoking status: Never Smoker   • Smokeless tobacco: Never Used   • Alcohol use No   • Drug use: No   • Sexual activity: Defer     Other Topics Concern   • Not on file     Social History Narrative   • No narrative on file     Family History   Problem Relation Age of  "Onset   • Hyperlipidemia Mother    • Migraines Mother    • Cancer Father         Urinary bladder   • Migraines Father    • No Known Problems Maternal Grandmother    • No Known Problems Maternal Grandfather    • No Known Problems Paternal Grandmother    • No Known Problems Paternal Grandfather      Review of Systems   Constitution: Positive for malaise/fatigue (chronic and attributed to working night shift). Negative for chills and fever.   HENT: Negative for ear pain, hearing loss, nosebleeds and sore throat.    Eyes: Negative for double vision, pain, vision loss in left eye and vision loss in right eye.   Cardiovascular: Positive for dyspnea on exertion (unchanged and mostly with carrying heavy boxes and walking). Negative for chest pain, claudication, irregular heartbeat, leg swelling, near-syncope, orthopnea, palpitations, paroxysmal nocturnal dyspnea and syncope.   Respiratory: Negative for cough, shortness of breath, snoring and wheezing.    Endocrine: Negative for cold intolerance and heat intolerance.   Hematologic/Lymphatic: Negative for bleeding problem.   Skin: Positive for rash (neck, face, and bilateral hands). Negative for color change, itching and unusual hair distribution.   Musculoskeletal: Negative for joint pain and joint swelling.   Gastrointestinal: Negative for abdominal pain, diarrhea, hematochezia, melena, nausea and vomiting.   Genitourinary: Negative for decreased libido, frequency, hematuria, hesitancy and incomplete emptying.   Neurological: Positive for light-headedness (mild yesterday when b/p was 123/73). Negative for excessive daytime sleepiness, dizziness, headaches, loss of balance, numbness, paresthesias and seizures.   Psychiatric/Behavioral: Negative for depression.          Objective:     Vitals:    10/08/18 0834   BP: (!) 186/90   BP Location: Left arm   Patient Position: Sitting   Pulse: 63   Weight: 75.8 kg (167 lb)   Height: 152.4 cm (60\")     Body mass index is 32.61 " kg/m².    PHYSICAL EXAM:  Physical Exam   Constitutional: She is oriented to person, place, and time. She appears well-developed and well-nourished. No distress.   HENT:   Head: Normocephalic and atraumatic.   Eyes: Pupils are equal, round, and reactive to light.   Neck: Neck supple. No JVD present. Carotid bruit is not present.   Cardiovascular: Normal rate, regular rhythm and intact distal pulses.    Murmur heard.   Harsh midsystolic murmur is present with a grade of 2/6  at the upper right sternal border radiating to the neck  Pulses:       Radial pulses are 2+ on the right side, and 2+ on the left side.   Pulmonary/Chest: Effort normal and breath sounds normal. No accessory muscle usage. No respiratory distress. She has no decreased breath sounds. She has no wheezes. She has no rhonchi. She has no rales. She exhibits no tenderness.   Abdominal: Soft. Bowel sounds are normal. She exhibits no distension. There is no hepatomegaly. There is no tenderness.   Obese   Musculoskeletal: Normal range of motion. She exhibits edema (mild swelling of bilateral fingers).   Neurological: She is alert and oriented to person, place, and time.   Skin: Skin is warm, dry and intact. Rash noted. She is not diaphoretic.   Red raised rash on back of neck. Facial redness/flushing. Dry, flaky cracked lesions on bilateral hands.    Psychiatric: She has a normal mood and affect. Her speech is normal and behavior is normal. Judgment and thought content normal. Cognition and memory are normal.   Nursing note and vitals reviewed.      Procedures    Assessment:       Diagnosis Plan   1. Essential hypertension  Ambulatory Referral to Sleep Medicine   2. Rash and nonspecific skin eruption  Ambulatory Referral to Sleep Medicine       Plan:     1. Hypertension: The patient still has uncontrolled blood pressures. Her cuff was evaluated compared to our b/p readings and reads about 15 points higher for her systolic readings and 5 points higher for  her diastolic readings.  Rather than her taking 12.5 mg of hydralazine 4 times a day, I will have her increase the hydralazine to 25 mg 3 times a day.  She will continue the Toprol-XL 50 mg, clonidine 0.1 mg daily and her spironolactone 100 mg daily.  She will come back in for blood pressure check in 7-10 days.  I will determine at that time if we need to further titrate medications.  I will see how her rash is doing at that time and if we need to start weaning off the beta blocker.  She has her potassium levels and kidney function checked a few times here by her PCP Beth Ralph. I will refer her to sleep medicine to be evaluated for possible sleep apnea as a cause of her uncontrolled HTN as the patient works night shift and does not have a good sleep schedule and is unsure if she snores.    2. Rash- On neck, face and hands. Gradually worsened since last time I saw her.  She works with known allergens as a respiratory therapist.  Could be related to the beta blocker.  She wants to try to continue the beta blocker for now.  If her rash is worse at her blood pressure check in 10 days I will have her wean off the beta blocker and we will try to titrate up one of her other medications.  I advised that she really needs to get back into see her allergist as this rash could be related to something other than the beta blocker.  She demonstrated understanding.          Follow up with Dr. Means in 1 month, unless otherwise needed sooner.  I advised the patient to contact our office with any questions or concerns.         Your medication list          Accurate as of 10/8/18  9:28 AM. If you have any questions, ask your nurse or doctor.               CHANGE how you take these medications      Instructions Last Dose Given Next Dose Due   CoQ-10 100 MG capsule  What changed:  · how much to take  · additional instructions      Take 200 mg by mouth Daily.       hydrALAZINE 25 MG tablet  Commonly known as:  APRESOLINE  What  changed:  · how much to take  · when to take this  Changed by:  JENNIFER Hines      Take 1 tablet by mouth 3 (Three) Times a Day.          CONTINUE taking these medications      Instructions Last Dose Given Next Dose Due   aspirin 325 MG tablet      Take 1 tablet by mouth Daily.       CloNIDine 0.1 MG tablet  Commonly known as:  CATAPRES      Take 0.1 mg by mouth Daily.       clopidogrel 75 MG tablet  Commonly known as:  PLAVIX      Take 1 tablet by mouth Daily.       fish oil 1000 MG capsule capsule      Take 1,000 mg by mouth Daily With Breakfast.       metoprolol succinate XL 50 MG 24 hr tablet  Commonly known as:  TOPROL-XL      Take 1 tablet by mouth Daily.       spironolactone 100 MG tablet  Commonly known as:  ALDACTONE      Take 1 tablet by mouth Daily.             Where to Get Your Medications      These medications were sent to [facility] [address] [place] [address] - [address] AT [facility] [address] - [phone]  - [phone]   [address], [facility] [address]    Phone:  [phone]   · hydrALAZINE 25 MG tablet         The above medication changes may not have been made by this provider.  Medication list was updated to reflect medications patient is currently taking including medication changes and discontinuations made by other healthcare providers.       I have reviewed this case with Dr. Means. It has been a pleasure to participate in this patient's care. Please feel free to contact me with any questions or concerns.     JENNIFER Hines  10/08/2018       Dictated utilizing Dragon Dictation System.

## 2018-10-08 ENCOUNTER — OFFICE VISIT (OUTPATIENT)
Dept: CARDIOLOGY | Facility: CLINIC | Age: 68
End: 2018-10-08

## 2018-10-08 VITALS
DIASTOLIC BLOOD PRESSURE: 90 MMHG | WEIGHT: 167 LBS | HEART RATE: 63 BPM | BODY MASS INDEX: 32.79 KG/M2 | SYSTOLIC BLOOD PRESSURE: 186 MMHG | HEIGHT: 60 IN

## 2018-10-08 DIAGNOSIS — R21 RASH AND NONSPECIFIC SKIN ERUPTION: ICD-10-CM

## 2018-10-08 DIAGNOSIS — I10 ESSENTIAL HYPERTENSION: Primary | ICD-10-CM

## 2018-10-08 PROCEDURE — 99213 OFFICE O/P EST LOW 20 MIN: CPT | Performed by: NURSE PRACTITIONER

## 2018-10-08 RX ORDER — HYDRALAZINE HYDROCHLORIDE 25 MG/1
25 TABLET, FILM COATED ORAL 3 TIMES DAILY
Qty: 30 TABLET | Refills: 2 | Status: SHIPPED | OUTPATIENT
Start: 2018-10-08 | End: 2018-11-20 | Stop reason: SINTOL

## 2018-11-19 NOTE — PROGRESS NOTES
Date of Office Visit: 2018  Encounter Provider: JENNIFER Hines  Place of Service: [facility] CARDIOLOGY  Patient Name: Roberta Houston  :1950      Subjective:     Chief Complaint:  Uncontrolled HTN, CVA    History of Present Illness:  Roberta Houston is a pleasant 68 y.o. female who known to me.  Outside records have been obtained and reviewed by me.     She has had issues in the past with sensitivities to multiple medications.  Currently we have been in the process of uptitrating medications to try to better control her blood pressure.  She does have an appointment for sleep study on 18. Since I saw her last month and we increased her hydralazine to 25 mg TID from 12.5 mg QID.  She is still having some elevated readings which can range anywhere from 122-175/55-85.  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  And she takes hydralazine at that time as well.  She takes hydralazine again at 4 PM and then she takes it at midnight. She takes clonidine at 8 PM because if she takes the clonidine and the hydralazine together she will get a little lightheaded.  Her heart rate is ranging 50s to 60s.  She denies any new complaints aside from some mild weight gain and some slight ankle swelling.  Her rash is continuing to worsen especially on her hands.  She reports significant discomfort on her fingers were she is having splitting of the skin.  She denies any chest pain, but reports dyspnea on exertion with carrying heavy bags.  This had not changed or worsened recently.  She denies any PND or orthopnea.  She denies any syncopal episodes.      18  Echo:  Left ventricular wall thickness is consistent with moderate concentric hypertrophy.  Left ventricular systolic function is normal. Estimated EF = 62%.  Left ventricular diastolic dysfunction (grade I) consistent with impaired  relaxation.  Global Longitudinal LV strain = -18%.  Mild aortic valve stenosis is present.  Mild mitral valve regurgitation is present  Mild tricuspid valve regurgitation is present.  Calculated right ventricular systolic pressure from tricuspid regurgitation is 23 mmHg.    Past Medical History:   Diagnosis Date   • Aortic stenosis    • CVA (cerebral vascular accident) (CMS/HCC)    • Dyslipidemia    • Hypertension    • Stroke (CMS/HCC)    • TIA (transient ischemic attack)      Past Surgical History:   Procedure Laterality Date   • TONSILLECTOMY     • TUBAL ABDOMINAL LIGATION     • WISDOM TOOTH EXTRACTION       Outpatient Medications Prior to Visit   Medication Sig Dispense Refill   • ascorbic acid (VITAMIN C) 1000 MG tablet Take 2,000 mg by mouth.     • aspirin 325 MG tablet Take 1 tablet by mouth Daily. 30 tablet 1   • CloNIDine (CATAPRES) 0.1 MG tablet Take 0.1 mg by mouth Daily.     • clopidogrel (PLAVIX) 75 MG tablet Take 1 tablet by mouth Daily. 30 tablet 1   • Coenzyme Q10 (COQ-10) 100 MG capsule Take 200 mg by mouth Daily. (Patient taking differently: Take 100 mg by mouth Daily. Taking 3 to 4 daily)     • metoprolol succinate XL (TOPROL-XL) 50 MG 24 hr tablet Take 1 tablet by mouth Daily. 30 tablet 11   • Omega-3 Fatty Acids (FISH OIL) 1000 MG capsule capsule Take 1,000 mg by mouth Daily With Breakfast.     • spironolactone (ALDACTONE) 100 MG tablet Take 1 tablet by mouth Daily. 30 tablet 1   • VITAMIN K PO Take  by mouth.     • Zinc 50 MG tablet Take  by mouth.     • hydrALAZINE (APRESOLINE) 25 MG tablet Take 1 tablet by mouth 3 (Three) Times a Day. 30 tablet 2     No facility-administered medications prior to visit.        Allergies as of 11/20/2018 - Reviewed 11/20/2018   Allergen Reaction Noted   • Valsartan Rash 09/24/2018   • Budesonide  12/18/2017   • Crestor [rosuvastatin calcium] Other (See Comments) 01/04/2018   • Lipitor [atorvastatin] Other (See Comments) 01/04/2018   • Zocor [simvastatin] Other  (See Comments) 01/04/2018   • Amlodipine Rash 02/15/2018   • Neomycin-bacitracin zn-polymyx Rash 12/18/2017   • Vitamin e Rash 12/18/2017     Social History     Socioeconomic History   • Marital status: Single     Spouse name: Not on file   • Number of children: Not on file   • Years of education: Not on file   • Highest education level: Not on file   Social Needs   • Financial resource strain: Not on file   • Food insecurity - worry: Not on file   • Food insecurity - inability: Not on file   • Transportation needs - medical: Not on file   • Transportation needs - non-medical: Not on file   Occupational History   • Not on file   Tobacco Use   • Smoking status: Never Smoker   • Smokeless tobacco: Never Used   Substance and Sexual Activity   • Alcohol use: No   • Drug use: No   • Sexual activity: Defer   Other Topics Concern   • Not on file   Social History Narrative   • Not on file     Family History   Problem Relation Age of Onset   • Hyperlipidemia Mother    • Migraines Mother    • Cancer Father         Urinary bladder   • Migraines Father    • No Known Problems Maternal Grandmother    • No Known Problems Maternal Grandfather    • No Known Problems Paternal Grandmother    • No Known Problems Paternal Grandfather      Review of Systems   Constitution: Positive for malaise/fatigue and weight gain (6-8lbs). Negative for chills and fever.   HENT: Negative for ear pain, hearing loss, nosebleeds and sore throat.    Eyes: Negative for double vision, pain, vision loss in left eye and vision loss in right eye.   Cardiovascular: Positive for dyspnea on exertion (with carrying heavy bags- unchanged) and leg swelling. Negative for chest pain, claudication, irregular heartbeat, near-syncope, orthopnea, palpitations, paroxysmal nocturnal dyspnea and syncope.   Respiratory: Negative for cough, shortness of breath, snoring and wheezing.    Endocrine: Negative for cold intolerance and heat intolerance. Polyuria: ankle swelling-  "intermittent.   Hematologic/Lymphatic: Negative for bleeding problem.   Skin: Positive for rash. Negative for color change, itching and unusual hair distribution.   Musculoskeletal: Negative for joint pain and joint swelling.   Gastrointestinal: Negative for abdominal pain, diarrhea, hematochezia, melena, nausea and vomiting.   Genitourinary: Negative for decreased libido, frequency, hematuria, hesitancy and incomplete emptying.   Neurological: Negative for excessive daytime sleepiness, dizziness, headaches, light-headedness, loss of balance, numbness, paresthesias and seizures.   Psychiatric/Behavioral: Negative for depression.          Objective:     Vitals:    11/20/18 1429   BP: 160/94   BP Location: Right arm   Patient Position: Sitting   Pulse: 62   Weight: 76.7 kg (169 lb)   Height: 152.4 cm (60\")     Body mass index is 33.01 kg/m².    PHYSICAL EXAM:  Physical Exam   Constitutional: She is oriented to person, place, and time. She appears well-developed and well-nourished. No distress.   HENT:   Head: Normocephalic and atraumatic.   Eyes: Pupils are equal, round, and reactive to light.   Neck: Neck supple. No JVD present. Carotid bruit is not present.   Cardiovascular: Normal rate, regular rhythm and intact distal pulses.   Murmur heard.   Harsh midsystolic murmur is present with a grade of 2/6 at the upper right sternal border radiating to the neck.  Pulses:       Radial pulses are 2+ on the right side, and 2+ on the left side.        Dorsalis pedis pulses are 2+ on the right side, and 2+ on the left side.        Posterior tibial pulses are 2+ on the right side, and 2+ on the left side.   Pulmonary/Chest: Effort normal and breath sounds normal. No accessory muscle usage. No respiratory distress. She has no decreased breath sounds. She has no wheezes. She has no rhonchi. She has no rales.   Abdominal: Soft. Bowel sounds are normal. She exhibits no distension. There is no splenomegaly or hepatomegaly. There is " no tenderness.   Musculoskeletal: Normal range of motion. She exhibits no edema (trace ankle).   Neurological: She is alert and oriented to person, place, and time.   Skin: Skin is warm, dry and intact. Rash noted. Laceration: facial flushing- mild. Rash is urticarial. She is not diaphoretic. There is erythema.        Psychiatric: She has a normal mood and affect. Her speech is normal and behavior is normal. Judgment and thought content normal. Cognition and memory are normal.   Nursing note and vitals reviewed.        ECG 12 Lead  Date/Time: 11/20/2018 2:33 PM  Performed by: Jacklyn Blanca APRN  Authorized by: Jacklyn Blanca APRN   Comparison: compared with previous ECG from 9/24/2018  Similar to previous ECG  Comparison to previous ECG: Unchanged compared to prior  Rhythm: sinus rhythm  BPM: 62  T flattening: V2, V1 and III  Q waves: V1, V2 and III  Clinical impression: abnormal ECG  Comments: Indication: HTN            Assessment:       Diagnosis Plan   1. Essential hypertension     2. Rash and nonspecific skin eruption     3. Nonrheumatic aortic valve stenosis         Plan:     1. Hypertension: The patient still has some uncontrolled blood pressures. Her home b/p cuff was evaluated compared to our b/p readings and reads about 15 points higher for her systolic readings and 5 points higher for her diastolic readings.  At this point her worsening rash or concerns me more then her blood pressure.  Her blood pressures been ranging 122-175/55-85 per her home cuff. For now b/c of her worsening rash we will have her stop the hydralazine that was increase at her last visit. She will continue with the metoprolol succinate 50 mg XL daily and Clonidine 0.1 mg daily.  She is on Spironolactone 100 mg daily by her PCP. She will continue to monitor her b/p at home, but it is possible that her reacting to one of these meds is driving her b/p up. In response to her mild leg swelling we could try Lasix vs. HCTZ at her next  visit but until her rash starts improving I am hesitant to add anything new at this time.     2. Rash- On neck, face and hands. Gradually worsened again since last time I saw her.  She works with known allergens as a respiratory therapist. I advised that she really needs to get back into see her allergist/dermatologist for repeat patch testing. For now we are going to stop the hydralazine and see if her rash improves at all.     The patient was agreeable to this plan of care.  She does also have a sleep study scheduled with Dr. Andre coming up in December.    Follow up with Dr. Means on 2/26/19, unless otherwise needed sooner.  I advised the patient to contact our office with any questions or concerns.         Your medication list           Accurate as of 11/20/18  4:55 PM. If you have any questions, ask your nurse or doctor.               CHANGE how you take these medications      Instructions Last Dose Given Next Dose Due   CoQ-10 100 MG capsule  What changed:    · how much to take  · additional instructions      Take 200 mg by mouth Daily.          CONTINUE taking these medications      Instructions Last Dose Given Next Dose Due   ascorbic acid 1000 MG tablet  Commonly known as:  VITAMIN C      Take 2,000 mg by mouth.       aspirin 325 MG tablet      Take 1 tablet by mouth Daily.       CloNIDine 0.1 MG tablet  Commonly known as:  CATAPRES      Take 0.1 mg by mouth Daily.       clopidogrel 75 MG tablet  Commonly known as:  PLAVIX      Take 1 tablet by mouth Daily.       fish oil 1000 MG capsule capsule      Take 1,000 mg by mouth Daily With Breakfast.       metoprolol succinate XL 50 MG 24 hr tablet  Commonly known as:  TOPROL-XL      Take 1 tablet by mouth Daily.       spironolactone 100 MG tablet  Commonly known as:  ALDACTONE      Take 1 tablet by mouth Daily.       VITAMIN K PO      Take  by mouth.       Zinc 50 MG tablet      Take  by mouth.          STOP taking these medications    hydrALAZINE 25 MG  tablet  Commonly known as:  APRESOLINE  Stopped by:  JENNIFER Hines               The above medication changes may not have been made by this provider.  Medication list was updated to reflect medications patient is currently taking including medication changes and discontinuations made by other healthcare providers.       I have reviewed this case with Dr. Means. It has been a pleasure to participate in this patient's care. Please feel free to contact me with any questions or concerns.     JENNIFER Hines  11/20/2018       Dictated utilizing Dragon Dictation System.

## 2018-11-20 ENCOUNTER — OFFICE VISIT (OUTPATIENT)
Dept: CARDIOLOGY | Facility: CLINIC | Age: 68
End: 2018-11-20

## 2018-11-20 VITALS
HEART RATE: 62 BPM | BODY MASS INDEX: 33.18 KG/M2 | WEIGHT: 169 LBS | HEIGHT: 60 IN | SYSTOLIC BLOOD PRESSURE: 160 MMHG | DIASTOLIC BLOOD PRESSURE: 94 MMHG

## 2018-11-20 DIAGNOSIS — R21 RASH AND NONSPECIFIC SKIN ERUPTION: ICD-10-CM

## 2018-11-20 DIAGNOSIS — I10 ESSENTIAL HYPERTENSION: Primary | ICD-10-CM

## 2018-11-20 DIAGNOSIS — I35.0 NONRHEUMATIC AORTIC VALVE STENOSIS: ICD-10-CM

## 2018-11-20 PROCEDURE — 99214 OFFICE O/P EST MOD 30 MIN: CPT | Performed by: NURSE PRACTITIONER

## 2018-11-20 PROCEDURE — 93000 ELECTROCARDIOGRAM COMPLETE: CPT | Performed by: NURSE PRACTITIONER

## 2018-11-20 RX ORDER — ZINC GLUCONATE 50 MG
TABLET ORAL
COMMUNITY

## 2018-11-20 RX ORDER — MULTIVIT WITH MINERALS/LUTEIN
2000 TABLET ORAL
COMMUNITY

## 2019-07-29 RX ORDER — METOPROLOL SUCCINATE 50 MG/1
50 TABLET, EXTENDED RELEASE ORAL DAILY
Qty: 90 TABLET | Refills: 1 | Status: SHIPPED | OUTPATIENT
Start: 2019-07-29

## 2020-11-04 NOTE — NURSING NOTE
Make appointment(s) for:   -- virtual visit follow up in 2 weeks.   -- get foot xray done at [facility]        Medication(s) prescribed today:    Orders Placed This Encounter   Medications     metoprolol succinate ER (TOPROL-XL) 50 MG 24 hr tablet     Sig: Take 1 tablet (50 mg) by mouth daily     Dispense:  90 tablet     Refill:  1     Dose increased. Please cancel previous prescription.     methylphenidate (METADATE ER) 20 MG CR tablet     Sig: Take 1 tablet (20 mg) by mouth every morning     Dispense:  30 tablet     Refill:  0     Pt restarted this 11/4/2020            Received referral through stroke order set. Based on therapy evals, no determined need for intensive acute inpatient rehab program. Will sign off. Thanks, Naomi MARAVILLA rehab admission nurse [phone]